# Patient Record
Sex: FEMALE | Race: WHITE | URBAN - METROPOLITAN AREA
[De-identification: names, ages, dates, MRNs, and addresses within clinical notes are randomized per-mention and may not be internally consistent; named-entity substitution may affect disease eponyms.]

---

## 2017-06-07 ENCOUNTER — OFFICE VISIT (OUTPATIENT)
Dept: ORTHOPEDIC SURGERY | Age: 52
End: 2017-06-07

## 2017-06-07 VITALS
DIASTOLIC BLOOD PRESSURE: 68 MMHG | WEIGHT: 151.24 LBS | HEART RATE: 71 BPM | HEIGHT: 65 IN | SYSTOLIC BLOOD PRESSURE: 109 MMHG | BODY MASS INDEX: 25.2 KG/M2

## 2017-06-07 DIAGNOSIS — M17.10 PATELLOFEMORAL ARTHRITIS: ICD-10-CM

## 2017-06-07 DIAGNOSIS — M25.561 RIGHT KNEE PAIN, UNSPECIFIED CHRONICITY: Primary | ICD-10-CM

## 2017-06-07 PROCEDURE — 73564 X-RAY EXAM KNEE 4 OR MORE: CPT | Performed by: ORTHOPAEDIC SURGERY

## 2017-06-07 PROCEDURE — 99204 OFFICE O/P NEW MOD 45 MIN: CPT | Performed by: ORTHOPAEDIC SURGERY

## 2017-06-13 ENCOUNTER — OFFICE VISIT (OUTPATIENT)
Dept: ORTHOPEDIC SURGERY | Age: 52
End: 2017-06-13

## 2017-06-13 VITALS
SYSTOLIC BLOOD PRESSURE: 106 MMHG | HEART RATE: 65 BPM | HEIGHT: 65 IN | DIASTOLIC BLOOD PRESSURE: 74 MMHG | BODY MASS INDEX: 25.2 KG/M2 | WEIGHT: 151.24 LBS

## 2017-06-13 DIAGNOSIS — M17.11 PATELLOFEMORAL ARTHRITIS OF RIGHT KNEE: Primary | ICD-10-CM

## 2017-06-13 PROCEDURE — 20610 DRAIN/INJ JOINT/BURSA W/O US: CPT | Performed by: ORTHOPAEDIC SURGERY

## 2017-06-13 PROCEDURE — 99214 OFFICE O/P EST MOD 30 MIN: CPT | Performed by: ORTHOPAEDIC SURGERY

## 2017-06-22 ENCOUNTER — HOSPITAL ENCOUNTER (OUTPATIENT)
Dept: PHYSICAL THERAPY | Age: 52
Discharge: HOME | End: 2017-06-30
Attending: ORTHOPAEDIC SURGERY | Admitting: ORTHOPAEDIC SURGERY

## 2017-07-01 ENCOUNTER — HOSPITAL ENCOUNTER (OUTPATIENT)
Dept: OTHER | Age: 52
Discharge: HOME | End: 2017-07-31
Attending: ORTHOPAEDIC SURGERY | Admitting: ORTHOPAEDIC SURGERY

## 2017-07-26 ENCOUNTER — OFFICE VISIT (OUTPATIENT)
Dept: ORTHOPEDIC SURGERY | Age: 52
End: 2017-07-26

## 2017-07-26 VITALS
SYSTOLIC BLOOD PRESSURE: 113 MMHG | WEIGHT: 151 LBS | HEART RATE: 70 BPM | DIASTOLIC BLOOD PRESSURE: 64 MMHG | HEIGHT: 64 IN | BODY MASS INDEX: 25.78 KG/M2

## 2017-07-26 DIAGNOSIS — M17.11 PATELLOFEMORAL ARTHRITIS OF RIGHT KNEE: Primary | ICD-10-CM

## 2017-07-26 DIAGNOSIS — M17.11 PRIMARY OSTEOARTHRITIS OF RIGHT KNEE: ICD-10-CM

## 2017-07-26 PROCEDURE — 99213 OFFICE O/P EST LOW 20 MIN: CPT | Performed by: ORTHOPAEDIC SURGERY

## 2017-08-03 ENCOUNTER — TELEPHONE (OUTPATIENT)
Dept: ORTHOPEDIC SURGERY | Age: 52
End: 2017-08-03

## 2017-08-04 ENCOUNTER — OFFICE VISIT (OUTPATIENT)
Dept: ORTHOPEDIC SURGERY | Age: 52
End: 2017-08-04

## 2017-08-04 VITALS
HEIGHT: 64 IN | BODY MASS INDEX: 25.78 KG/M2 | DIASTOLIC BLOOD PRESSURE: 68 MMHG | SYSTOLIC BLOOD PRESSURE: 106 MMHG | WEIGHT: 151.01 LBS | HEART RATE: 67 BPM

## 2017-08-04 DIAGNOSIS — M75.42 IMPINGEMENT SYNDROME, SHOULDER, LEFT: ICD-10-CM

## 2017-08-04 DIAGNOSIS — S29.011A STRAIN OF LEFT PECTORALIS MUSCLE, INITIAL ENCOUNTER: ICD-10-CM

## 2017-08-04 DIAGNOSIS — M17.11 PATELLOFEMORAL ARTHRITIS OF RIGHT KNEE: Primary | ICD-10-CM

## 2017-08-04 PROCEDURE — 20610 DRAIN/INJ JOINT/BURSA W/O US: CPT | Performed by: ORTHOPAEDIC SURGERY

## 2017-08-07 ENCOUNTER — TELEPHONE (OUTPATIENT)
Dept: ORTHOPEDIC SURGERY | Age: 52
End: 2017-08-07

## 2017-12-05 ENCOUNTER — OFFICE VISIT (OUTPATIENT)
Dept: ORTHOPEDIC SURGERY | Age: 52
End: 2017-12-05

## 2017-12-05 VITALS
BODY MASS INDEX: 22.82 KG/M2 | WEIGHT: 137 LBS | HEART RATE: 59 BPM | HEIGHT: 65 IN | SYSTOLIC BLOOD PRESSURE: 95 MMHG | DIASTOLIC BLOOD PRESSURE: 65 MMHG

## 2017-12-05 DIAGNOSIS — M17.11 PATELLOFEMORAL ARTHRITIS OF RIGHT KNEE: Primary | ICD-10-CM

## 2017-12-05 PROCEDURE — 20610 DRAIN/INJ JOINT/BURSA W/O US: CPT | Performed by: ORTHOPAEDIC SURGERY

## 2017-12-05 PROCEDURE — 99214 OFFICE O/P EST MOD 30 MIN: CPT | Performed by: ORTHOPAEDIC SURGERY

## 2017-12-05 NOTE — PROGRESS NOTES
Patient seen for follow-up evaluation of her right knee. She had a Synvisc 1 injection done on August 4. She says that her pain returned about 4 weeks later. She started to feel another areas around knee and describes it being tight in the back when she flexes her knee. No new injuries.     Pain Assessment  Location of Pain: Knee  Location Modifiers: Right  Severity of Pain: 4  Quality of Pain: Dull, Aching  Duration of Pain: Persistent  Frequency of Pain: Intermittent  Aggravating Factors: Walking, Kneeling, Squatting, Stairs  Limiting Behavior: Yes  Relieving Factors: Rest  Result of Injury: No  Work-Related Injury: No  Are there other pain locations you wish to document?: No    Past Medical History:   Diagnosis Date    Anxiety     Basal cell carcinoma 2002    Cystitis, chronic     Heart murmur     PONV (postoperative nausea and vomiting)         Past Surgical History:   Procedure Laterality Date    COLONOSCOPY  07/08/2016    JOINT REPLACEMENT Left July 2013    knee    KNEE ARTHROSCOPY      left    KNEE SURGERY      left knee    LAPAROSCOPY      SPINE SURGERY      fusion       Family History   Problem Relation Age of Onset    Osteoarthritis Mother     Asthma Mother     Hypertension Mother     Hypertension Father        Social History     Social History    Marital status:      Spouse name: N/A    Number of children: N/A    Years of education: N/A     Social History Main Topics    Smoking status: Never Smoker    Smokeless tobacco: Never Used    Alcohol use No      Comment: social    Drug use: No    Sexual activity: Yes     Partners: Male     Other Topics Concern    None     Social History Narrative    None       Current Outpatient Prescriptions   Medication Sig Dispense Refill    zolpidem (AMBIEN) 5 MG tablet Take 5 mg by mouth nightly as needed for Sleep      escitalopram (LEXAPRO) 10 MG tablet TAKE ONE TABLET BY MOUTH EVERY DAY (Patient taking differently: TAKE ONE TABLET BY

## 2018-02-23 ENCOUNTER — OFFICE VISIT (OUTPATIENT)
Dept: ORTHOPEDIC SURGERY | Age: 53
End: 2018-02-23

## 2018-02-23 VITALS
WEIGHT: 140 LBS | HEIGHT: 65 IN | BODY MASS INDEX: 23.32 KG/M2 | DIASTOLIC BLOOD PRESSURE: 69 MMHG | SYSTOLIC BLOOD PRESSURE: 96 MMHG | HEART RATE: 83 BPM

## 2018-02-23 DIAGNOSIS — M75.32 CALCIFIC TENDONITIS OF LEFT SHOULDER: Primary | ICD-10-CM

## 2018-02-23 DIAGNOSIS — M25.512 LEFT SHOULDER PAIN, UNSPECIFIED CHRONICITY: ICD-10-CM

## 2018-02-23 PROCEDURE — 99215 OFFICE O/P EST HI 40 MIN: CPT | Performed by: ORTHOPAEDIC SURGERY

## 2018-02-23 RX ORDER — DULOXETIN HYDROCHLORIDE 30 MG/1
CAPSULE, DELAYED RELEASE ORAL
COMMUNITY
Start: 2017-12-08 | End: 2018-06-12

## 2018-02-23 ASSESSMENT — ENCOUNTER SYMPTOMS: ROS SKIN COMMENTS: BASAL CELL CANCER

## 2018-02-23 NOTE — PROGRESS NOTES
Cortisone injection: left shoulder     2cc depo medrol 40mg    TAS:[id number]  Exp:04/2020  Ndc:5453-2048-60    4cc lidocaine 1%hcl    Lot:-[id number]  Exp:jan 1 2019  Ndc: 5346-2893-28
Review of Systems   Constitutional:        Weight change   Musculoskeletal: Positive for joint pain. Right knee pain   Skin:        Basal cell cancer   All other systems reviewed and are negative.
Narrative    None       Current Outpatient Prescriptions   Medication Sig Dispense Refill    DULoxetine (CYMBALTA) 30 MG extended release capsule 1 po daily for 14 days then increase to 2 po qd      zolpidem (AMBIEN) 5 MG tablet Take 5 mg by mouth nightly as needed for Sleep      cetirizine (ZYRTEC ALLERGY) 10 MG tablet Take 10 mg by mouth daily Indications: takes in evening       mometasone (NASONEX) 50 MCG/ACT nasal spray 2 sprays by Nasal route daily. (Patient taking differently: 2 sprays by Nasal route daily Indications: takes in evening ) 17 g 1    escitalopram (LEXAPRO) 10 MG tablet TAKE ONE TABLET BY MOUTH EVERY DAY (Patient taking differently: TAKE ONE TABLET BY MOUTH EVERY DAY takes in evening) 30 tablet 2     No current facility-administered medications for this visit. Allergies   Allergen Reactions    Other Other (See Comments)     Bandaids cause skin irritation       Vital signs:  BP 96/69   Pulse 83   Ht 5' 5\" (1.651 m)   Wt 140 lb (63.5 kg)   BMI 23.30 kg/m²        Neuro: Alert & oriented x 3,  normal,  no focal deficits noted. Normal affect. Eyes: sclera clear  Ears: Normal external ear  Mouth:  No perioral lesions  Pulm: Respirations unlabored and regular  Pulse: Regular rate and rhythm   Skin: Warm, well perfused        Left Shoulder Examination:    Inspection: No abnormal swelling. No erythema. No induration. Palpation: Tenderness of the greater tuberosity. Acromioclavicular joint: Nontender to palpation. Range of Motion: Full range of motion. Strength: Mild supraspinatus muscle weakness secondary to pain. Instability: No anterior or posterior subluxation. Special Tests:  Positive impingement findings. Neurovascular: Skin warm well perfused. Neurovascularly intact. Right Shoulder Examination:    Inspection: No abnormal swelling. No erythema. No induration. Palpation: No tenderness to palpation. No palpable masses.   No

## 2018-06-08 ENCOUNTER — TELEPHONE (OUTPATIENT)
Dept: ORTHOPEDIC SURGERY | Age: 53
End: 2018-06-08

## 2018-06-08 ENCOUNTER — OFFICE VISIT (OUTPATIENT)
Dept: ORTHOPEDIC SURGERY | Age: 53
End: 2018-06-08

## 2018-06-08 VITALS
SYSTOLIC BLOOD PRESSURE: 95 MMHG | HEIGHT: 64 IN | DIASTOLIC BLOOD PRESSURE: 67 MMHG | BODY MASS INDEX: 23.56 KG/M2 | HEART RATE: 73 BPM | WEIGHT: 138 LBS

## 2018-06-08 DIAGNOSIS — M75.102 TEAR OF LEFT ROTATOR CUFF, UNSPECIFIED TEAR EXTENT: Primary | ICD-10-CM

## 2018-06-08 DIAGNOSIS — M17.11 PATELLOFEMORAL ARTHRITIS OF RIGHT KNEE: ICD-10-CM

## 2018-06-08 PROCEDURE — 99214 OFFICE O/P EST MOD 30 MIN: CPT | Performed by: ORTHOPAEDIC SURGERY

## 2018-06-12 ENCOUNTER — TELEPHONE (OUTPATIENT)
Dept: ORTHOPEDIC SURGERY | Age: 53
End: 2018-06-12

## 2018-06-12 RX ORDER — FOLIC ACID 1 MG/1
1 TABLET ORAL DAILY
COMMUNITY

## 2018-06-12 RX ORDER — ESZOPICLONE 3 MG/1
3 TABLET, FILM COATED ORAL NIGHTLY
COMMUNITY

## 2018-06-14 ENCOUNTER — HOSPITAL ENCOUNTER (OUTPATIENT)
Dept: PREADMISSION TESTING | Age: 53
Discharge: HOME | End: 2018-06-14
Attending: ORTHOPAEDIC SURGERY | Admitting: ORTHOPAEDIC SURGERY

## 2018-06-14 VITALS
HEART RATE: 70 BPM | HEIGHT: 64 IN | BODY MASS INDEX: 23.56 KG/M2 | DIASTOLIC BLOOD PRESSURE: 70 MMHG | WEIGHT: 138 LBS | OXYGEN SATURATION: 98 % | TEMPERATURE: 98.4 F | SYSTOLIC BLOOD PRESSURE: 120 MMHG | RESPIRATION RATE: 17 BRPM

## 2018-06-14 DIAGNOSIS — G89.18 POST-OP PAIN: Primary | ICD-10-CM

## 2018-06-14 LAB
GLUCOSE BLD-MCNC: 80 MG/DL (ref 70–99)
PERFORMED ON: NORMAL

## 2018-06-14 RX ORDER — ROPIVACAINE HYDROCHLORIDE 5 MG/ML
60 INJECTION, SOLUTION EPIDURAL; INFILTRATION; PERINEURAL ONCE
Status: DISCONTINUED | OUTPATIENT
Start: 2018-06-14 | End: 2018-06-15 | Stop reason: HOSPADM

## 2018-06-14 RX ORDER — MIDAZOLAM HYDROCHLORIDE 1 MG/ML
2 INJECTION INTRAMUSCULAR; INTRAVENOUS ONCE
Status: COMPLETED | OUTPATIENT
Start: 2018-06-14 | End: 2018-06-14

## 2018-06-14 RX ORDER — FENTANYL CITRATE 50 UG/ML
25 INJECTION, SOLUTION INTRAMUSCULAR; INTRAVENOUS EVERY 5 MIN PRN
Status: DISCONTINUED | OUTPATIENT
Start: 2018-06-14 | End: 2018-06-15 | Stop reason: HOSPADM

## 2018-06-14 RX ORDER — FENTANYL CITRATE 50 UG/ML
25 INJECTION, SOLUTION INTRAMUSCULAR; INTRAVENOUS ONCE
Status: COMPLETED | OUTPATIENT
Start: 2018-06-14 | End: 2018-06-14

## 2018-06-14 RX ORDER — ONDANSETRON 2 MG/ML
4 INJECTION INTRAMUSCULAR; INTRAVENOUS ONCE
Status: DISCONTINUED | OUTPATIENT
Start: 2018-06-14 | End: 2018-06-15 | Stop reason: HOSPADM

## 2018-06-14 RX ORDER — FENTANYL CITRATE 50 UG/ML
50 INJECTION, SOLUTION INTRAMUSCULAR; INTRAVENOUS EVERY 5 MIN PRN
Status: DISCONTINUED | OUTPATIENT
Start: 2018-06-14 | End: 2018-06-15 | Stop reason: HOSPADM

## 2018-06-14 RX ORDER — DEXAMETHASONE SODIUM PHOSPHATE 4 MG/ML
4 INJECTION, SOLUTION INTRA-ARTICULAR; INTRALESIONAL; INTRAMUSCULAR; INTRAVENOUS; SOFT TISSUE
Status: ACTIVE | OUTPATIENT
Start: 2018-06-14 | End: 2018-06-14

## 2018-06-14 RX ORDER — HYDRALAZINE HYDROCHLORIDE 20 MG/ML
5 INJECTION INTRAMUSCULAR; INTRAVENOUS EVERY 10 MIN PRN
Status: DISCONTINUED | OUTPATIENT
Start: 2018-06-14 | End: 2018-06-15 | Stop reason: HOSPADM

## 2018-06-14 RX ORDER — PROMETHAZINE HYDROCHLORIDE 25 MG/ML
6.25 INJECTION, SOLUTION INTRAMUSCULAR; INTRAVENOUS
Status: ACTIVE | OUTPATIENT
Start: 2018-06-14 | End: 2018-06-14

## 2018-06-14 RX ORDER — MEPERIDINE HYDROCHLORIDE 25 MG/ML
12.5 INJECTION INTRAMUSCULAR; INTRAVENOUS; SUBCUTANEOUS EVERY 5 MIN PRN
Status: DISCONTINUED | OUTPATIENT
Start: 2018-06-14 | End: 2018-06-15 | Stop reason: HOSPADM

## 2018-06-14 RX ORDER — SCOLOPAMINE TRANSDERMAL SYSTEM 1 MG/1
1 PATCH, EXTENDED RELEASE TRANSDERMAL
Status: DISCONTINUED | OUTPATIENT
Start: 2018-06-14 | End: 2018-06-15 | Stop reason: HOSPADM

## 2018-06-14 RX ORDER — SODIUM CHLORIDE, SODIUM LACTATE, POTASSIUM CHLORIDE, CALCIUM CHLORIDE 600; 310; 30; 20 MG/100ML; MG/100ML; MG/100ML; MG/100ML
INJECTION, SOLUTION INTRAVENOUS CONTINUOUS
Status: DISCONTINUED | OUTPATIENT
Start: 2018-06-14 | End: 2018-06-15 | Stop reason: HOSPADM

## 2018-06-14 RX ORDER — ONDANSETRON 2 MG/ML
4 INJECTION INTRAMUSCULAR; INTRAVENOUS
Status: COMPLETED | OUTPATIENT
Start: 2018-06-14 | End: 2018-06-14

## 2018-06-14 RX ORDER — LABETALOL HYDROCHLORIDE 5 MG/ML
5 INJECTION, SOLUTION INTRAVENOUS EVERY 10 MIN PRN
Status: DISCONTINUED | OUTPATIENT
Start: 2018-06-14 | End: 2018-06-15 | Stop reason: HOSPADM

## 2018-06-14 RX ORDER — ONDANSETRON 2 MG/ML
4 INJECTION INTRAMUSCULAR; INTRAVENOUS
Status: ACTIVE | OUTPATIENT
Start: 2018-06-14 | End: 2018-06-14

## 2018-06-14 RX ORDER — MIDAZOLAM HYDROCHLORIDE 1 MG/ML
1 INJECTION INTRAMUSCULAR; INTRAVENOUS ONCE
Status: DISCONTINUED | OUTPATIENT
Start: 2018-06-14 | End: 2018-06-15 | Stop reason: HOSPADM

## 2018-06-14 RX ADMIN — SODIUM CHLORIDE, SODIUM LACTATE, POTASSIUM CHLORIDE, CALCIUM CHLORIDE: 600; 310; 30; 20 INJECTION, SOLUTION INTRAVENOUS at 10:25

## 2018-06-14 RX ADMIN — ONDANSETRON 4 MG: 2 INJECTION INTRAMUSCULAR; INTRAVENOUS at 11:50

## 2018-06-14 RX ADMIN — MIDAZOLAM HYDROCHLORIDE 2 MG: 1 INJECTION INTRAMUSCULAR; INTRAVENOUS at 11:15

## 2018-06-14 RX ADMIN — FENTANYL CITRATE 50 MCG: 50 INJECTION, SOLUTION INTRAMUSCULAR; INTRAVENOUS at 11:29

## 2018-06-14 ASSESSMENT — PAIN SCALES - GENERAL
PAINLEVEL_OUTOF10: 0
PAINLEVEL_OUTOF10: 0

## 2018-06-14 ASSESSMENT — PAIN - FUNCTIONAL ASSESSMENT: PAIN_FUNCTIONAL_ASSESSMENT: 0-10

## 2018-06-18 ENCOUNTER — TELEPHONE (OUTPATIENT)
Dept: ORTHOPEDIC SURGERY | Age: 53
End: 2018-06-18

## 2018-06-20 ENCOUNTER — OFFICE VISIT (OUTPATIENT)
Dept: ORTHOPEDIC SURGERY | Age: 53
End: 2018-06-20

## 2018-06-20 ENCOUNTER — HOSPITAL ENCOUNTER (OUTPATIENT)
Dept: PHYSICAL THERAPY | Age: 53
Discharge: HOME | End: 2018-06-30
Attending: ORTHOPAEDIC SURGERY | Admitting: ORTHOPAEDIC SURGERY

## 2018-06-20 VITALS
HEIGHT: 64 IN | SYSTOLIC BLOOD PRESSURE: 110 MMHG | WEIGHT: 138 LBS | BODY MASS INDEX: 23.56 KG/M2 | HEART RATE: 65 BPM | DIASTOLIC BLOOD PRESSURE: 70 MMHG

## 2018-06-20 DIAGNOSIS — M75.102 TEAR OF LEFT ROTATOR CUFF, UNSPECIFIED TEAR EXTENT: Primary | ICD-10-CM

## 2018-06-20 DIAGNOSIS — M75.32 CALCIFIC TENDONITIS OF LEFT SHOULDER: ICD-10-CM

## 2018-06-20 DIAGNOSIS — Z98.890 S/P ROTATOR CUFF REPAIR: ICD-10-CM

## 2018-06-20 PROCEDURE — 99024 POSTOP FOLLOW-UP VISIT: CPT | Performed by: ORTHOPAEDIC SURGERY

## 2018-06-20 RX ORDER — OXYCODONE HYDROCHLORIDE AND ACETAMINOPHEN 5; 325 MG/1; MG/1
1 TABLET ORAL EVERY 6 HOURS PRN
Qty: 28 TABLET | Refills: 0 | Status: SHIPPED | OUTPATIENT
Start: 2018-06-20 | End: 2018-06-27

## 2018-06-20 NOTE — FLOWSHEET NOTE
order to prevent re-injury. 2. Patient will have a decrease in pain to facilitate improvement in movement, function, and ADLs as indicated by Functional Deficits. Long Term Goals: To be achieved in:16 weeks  1. Disability index score of 25% or less for the UEFS to assist with reaching prior level of function. 2. Patient will demonstrate increased [facility] to [facility] to allow for proper joint functioning as indicated by patients Functional Deficits. 3. Patient will demonstrate an increase in Strength to 4+/5 scapular and core control  for UE to allow for proper functional mobility as indicated by patients Functional Deficits. 4. Patient will return to  functional activities such as donning/doffing clothing  without increased symptoms or restriction. Progression Towards Functional goals:  [] Patient is progressing as expected towards functional goals listed. [] Progression is slowed due to complexities listed. [] Progression has been slowed due to co-morbidities. [x] Plan just implemented, too soon to assess goals progression  [] Other:     ASSESSMENT:  See eval    Treatment/Activity Tolerance:  [x] Patient tolerated treatment well [] Patient limited by fatique  [] Patient limited by pain  [] Patient limited by other medical complications  [] Other:     Patient education:  6/20 - Reviewed prognosis/POC, ROM restrictions, s/s of infection, sling wearing schedule. Sling adjusted for proper fit.      Prognosis: [] Good [x] Fair  [] Poor    Patient Requires Follow-up: [x] Yes  [] No    PLAN: See eval  [] Continue per plan of care [] Alter current plan (see comments)  [x] Plan of care initiated [] Hold pending MD visit [] Discharge    Electronically signed by: Skip Ham PT, DPT

## 2018-06-20 NOTE — PLAN OF CARE
The [address]  Phone [phone]  Fax [phone]      Physical Therapy Certification    Dear Referring Practitioner: Odalys Kan,    We had the pleasure of evaluating the following patient for physical therapy services at [address] [place] [address]. A summary of our findings can be found in the initial assessment below. This includes our plan of care. If you have any questions or concerns regarding these findings, please do not hesitate to contact me at the office phone number checked above. Thank you for the referral.       Physician Signature:_______________________________Date:__________________  By signing above (or electronic signature), therapists plan is approved by physician      Patient: Marquis Lipscomb   : 1965   MRN: [id number]  Referring Physician: Referring Practitioner: Odalys Kan      Evaluation Date: 2018      Medical Diagnosis Information:  Diagnosis: M75.102 (ICD-10-CM) - Tear of left rotator cuff, unspecified tear extent M75.32 (ICD-10-CM) - Calcific tendonitis of left shoulder Z98.890 (ICD-10-CM) - S/P rotator cuff repair   Treatment Diagnosis: L shoulder pain, impaired ROM, impaired strength secondary to RTC repair surgery                                          Insurance information: PT Insurance Information: Sara (20 visits)    Precautions/ Contra-indications: Dr. Delta Card RTC Repair protocol   Latex Allergy:  [x]NO      []YES  Preferred Language for Healthcare:   [x]English       []other:    SUBJECTIVE: Patient stated complaint: pt is a 47 yof 1 wk s/p Left shoulder arthroscopy with labral debridement; chondroplasty; debridement of calcium deposits, rotator cuff; rotator cuff repair; and subacromial decompression (DOS 18). Injured herself through wear and tear and fell down a flight of concrete steps. PLOF indep w/ all ADLS and full mobility .  Would higher risk   TUG score (>12s at risk):     [] Falls education provided, including       G-Codes:  PT G-Codes  Functional Assessment Tool Used: UEFI  Score: 100% impaired  Functional Limitation: Carrying, moving and handling objects  Carrying, Moving and Handling Objects Current Status (): 100 percent impaired, limited or restricted  Carrying, Moving and Handling Objects Goal Status (): At least 20 percent but less than 40 percent impaired, limited or restricted    ASSESSMENT:   Functional Impairments   [x]Noted spinal or UE joint hypomobility   []Noted spinal or UE joint hypermobility   [x]Decreased UE functional ROM   [x]Decreased UE functional strength   []Abnormal reflexes/sensation/myotomal/dermatomal deficits   [x]Decreased RC/scapular/core strength and neuromuscular control   []other:      Functional Activity Limitations (from functional questionnaire and intake)   [x]Reduced ability to tolerate prolonged functional positions   [x]Reduced ability or difficulty with changes of positions or transfers between positions   [x]Reduced ability to maintain good posture and demonstrate good body mechanics with sitting, bending, and lifting   [x] Reduced ability or tolerance with driving and/or computer work   [x]Reduced ability to sleep   [x]Reduced ability to perform lifting, reaching, carrying tasks   [x]Reduced ability to tolerate impact through UE   [x]Reduced ability to reach behind back   [x]Reduced ability to  or hold objects   [x]Reduced ability to throw or toss an object   []other:      Participation Restrictions   [x]Reduced participation in self care activities   [x]Reduced participation in home management activities   [x]Reduced participation in work activities   [x]Reduced participation in social activities. [x]Reduced participation in sport / recreational activities.     Classification:   [x]Signs/symptoms consistent with post-surgical status including decreased ROM, strength and

## 2018-06-26 NOTE — FLOWSHEET NOTE
IR        Other        Other             Strength L R Comment   Flexion      Abduction      ER      IR      Supraspinatus      Upper Trap      Lower Trap      Mid Trap      Rhomboids      Biceps      Triceps      Horizontal Abduction      Horizontal Adduction      Lats          RESTRICTIONS/PRECAUTIONS:  Dr. Suzy Hoff RTC Repair Protocol       Exercises/Interventions:   Exercise/Equipment Resistance/Repetitions Other comments   Aerobic Conditioning     Aerodyne          Stretching/PROM     Wand     Table Slides 10x:10    Wall slides      UE [place]     Pulleys     Pendulum     BB IR     SL IR     Pec doorway stretch     CBA stretch     UT stretch     LS stretch     Elbow flex/ext 10x:10                   Isometrics     Retraction 30x         Weight shift     Flexion     Abduction     External Rotation     Internal Rotation     Biceps     Triceps          PRE's     Flexion     Abduction     External Rotation     Internal Rotation     Shrugs 30x    EXT     Reverse Flys     Serratus     Horizontal Abd with ER     Biceps     Triceps     Retraction 30x         Cable Column/Theraband     External Rotation     Internal Rotation     Shrugs     Lats     Ext     Flex     Scapular Retraction     BIC     TRIC     PNF          Dynamic Stability          Plyoback              Therapeutic Exercise and NMR EXR  [x] (93783) Provided verbal/tactile cueing for activities related to strengthening, flexibility, endurance, ROM  for improvements in scapular, scapulothoracic and UE control with self care, reaching, carrying, lifting, house/yardwork, driving/computer work.    [] (53613) Provided verbal/tactile cueing for activities related to improving balance, coordination, kinesthetic sense, posture, motor skill, proprioception  to assist with  scapular, scapulothoracic and UE control with self care, reaching, carrying, lifting, house/yardwork, driving/computer work.     Therapeutic Activities:    [] (54737 or 79829) Provided Independent in HEP and progression per patient tolerance, in order to prevent re-injury. 2. Patient will have a decrease in pain to facilitate improvement in movement, function, and ADLs as indicated by Functional Deficits. Long Term Goals: To be achieved in:16 weeks  1. Disability index score of 25% or less for the UEFS to assist with reaching prior level of function. 2. Patient will demonstrate [facility] to [facility] to allow for proper joint functioning as indicated by patients Functional Deficits. 3. Patient will demonstrate an increase in Strength to 4+/5 scapular and core control  for UE to allow for proper functional mobility as indicated by patients Functional Deficits. 4. Patient will return to  functional activities such as donning/doffing clothing  without increased symptoms or restriction. Progression Towards Functional goals:  [] Patient is progressing as expected towards functional goals listed. [] Progression is slowed due to complexities listed. [] Progression has been slowed due to co-morbidities. [x] Plan just implemented, too soon to assess goals progression  [] Other:     ASSESSMENT:      Treatment/Activity Tolerance:  [x] Patient tolerated treatment well [] Patient limited by fatique  [] Patient limited by pain  [] Patient limited by other medical complications  [] Other: Pt colby tx well. She demo good PROM table slides without c/o pain/problems. She demo good comprehension of HEP. We did adjust her sling for comfort. We discussed frequency. Pt will be leaving in September to return to the [facility]. 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 She would like to be able to return to [facility] and [facility]. Per Tyler Cardenas PT, pt can come out of the sling at 4-6 weeks. She can drive when she is out of the sling and off narcotic pain medications.   She will see Dr. Cierra Billings at 6 wks post op unless he is out of the office.- 6/26/18    Patient education:  6/20 - Reviewed prognosis/POC, ROM restrictions, s/s of infection, sling wearing schedule. Sling adjusted for proper fit. Prognosis: [] Good [x] Fair  [] Poor    Patient Requires Follow-up: [x] Yes  [] No    PLAN: Consider gentle cane ER, table slide scaption, PROM manual pending pt's available motion. Continue tx once a week to every other week.      [x] Continue per plan of care [] Alter current plan (see comments)  [] Plan of care initiated [] Hold pending MD visit [] Discharge    Electronically signed by: Kelly Day DPT [id number]

## 2018-07-01 ENCOUNTER — HOSPITAL ENCOUNTER (OUTPATIENT)
Dept: OTHER | Age: 53
Discharge: HOME | End: 2018-07-31
Attending: ORTHOPAEDIC SURGERY | Admitting: ORTHOPAEDIC SURGERY

## 2018-07-01 ENCOUNTER — HOSPITAL ENCOUNTER (OUTPATIENT)
Dept: PHYSICAL THERAPY | Age: 53
Discharge: HOME | End: 2018-07-01
Attending: ORTHOPAEDIC SURGERY | Admitting: ORTHOPAEDIC SURGERY

## 2018-07-05 ENCOUNTER — HOSPITAL ENCOUNTER (OUTPATIENT)
Dept: PHYSICAL THERAPY | Age: 53
Discharge: HOME | End: 2018-07-06
Admitting: ORTHOPAEDIC SURGERY

## 2018-07-05 NOTE — FLOWSHEET NOTE
Abduction      ER      IR      Supraspinatus      Upper Trap      Lower Trap      Mid Trap      Rhomboids      Biceps      Triceps      Horizontal Abduction      Horizontal Adduction      Lats          RESTRICTIONS/PRECAUTIONS:  Dr. Tobin Hernandez RTC Repair Protocol       Exercises/Interventions:   Exercise/Equipment Resistance/Repetitions Other comments   Aerobic Conditioning     Aerodyne          Stretching/PROM     Wand 10x:10 Seated ER   Table Slides 10x:10 Flex and scap   Wall slides      UE [place]     Pulleys     Pendulum     BB IR     SL IR     Pec doorway stretch     CBA stretch     UT stretch     LS stretch     Elbow flex/ext                    Isometrics     Retraction 30x     30x green    Weight shift     Flexion     Abduction     External Rotation     Internal Rotation     Biceps     Triceps          PRE's     Flexion     Abduction     External Rotation     Internal Rotation     Shrugs 30x    EXT     Reverse Flys     Serratus     Horizontal Abd with ER     Biceps     Triceps     Retraction 30x    Wrist ext ECL 3x10 1# ECL              Cable Column/Theraband     External Rotation     Internal Rotation     Shrugs     Lats     Ext     Flex     Scapular Retraction     BIC     TRIC     PNF          Dynamic Stability          Plyoback          Manual 8' gentle flex, scap, IR, ER at 30 deg ABD             Therapeutic Exercise and NMR EXR  [x] (97455) Provided verbal/tactile cueing for activities related to strengthening, flexibility, endurance, ROM  for improvements in scapular, scapulothoracic and UE control with self care, reaching, carrying, lifting, house/yardwork, driving/computer work.    [] (13816) Provided verbal/tactile cueing for activities related to improving balance, coordination, kinesthetic sense, posture, motor skill, proprioception  to assist with  scapular, scapulothoracic and UE control with self care, reaching, carrying, lifting, house/yardwork, driving/computer work.     Therapeutic Activities:    [] (16813 or 55161) Provided verbal/tactile cueing for activities related to improving balance, coordination, kinesthetic sense, posture, motor skill, proprioception and motor activation to allow for proper function of scapular, scapulothoracic and UE control with self care, carrying, lifting, driving/computer work.      Home Exercise Program:    [x] (42699) Reviewed/Progressed HEP activities related to strengthening, flexibility, endurance, ROM of scapular, scapulothoracic and UE control with self care, reaching, carrying, lifting, house/yardwork, driving/computer work  [] (90552) Reviewed/Progressed HEP activities related to improving balance, coordination, kinesthetic sense, posture, motor skill, proprioception of scapular, scapulothoracic and UE control with self care, reaching, carrying, lifting, house/yardwork, driving/computer work      Manual Treatments:  PROM / STM / Oscillations-Mobs:  G-I, II, III, IV (PA's, Inf., Post.)  [x] (98644) Provided manual therapy to mobilize soft tissue/joints of cervical/CT, scapular GHJ and UE for the purpose of modulating pain, promoting relaxation,  increasing ROM, reducing/eliminating soft tissue swelling/inflammation/restriction, improving soft tissue extensibility and allowing for proper ROM for normal function with self care, reaching, carrying, lifting, house/yardwork, driving/computer work    Modalities: declined    Charges:   Timed Code Treatment Minutes: 23'   Total Treatment Minutes: 45'       [] EVAL (LOW) 95329 (typically 20 minutes face-to-face)  [] EVAL (MOD) 04409 (typically 30 minutes face-to-face)  [] EVAL (HIGH) 14700 (typically 45 minutes face-to-face)  [] RE-EVAL     [] NM(94235) x      [] IONTO  [] NMR (01993) x      [] VASO  [x] Manual (55233) x  1    [] Other:  [x] TA x  1    [] Mech Traction (23893)  [] ES(attended) (85592)      [] ES (un) (51569):     GOALS:  Patient stated goal: full use of arm    Therapist goals for Patient:   Short Term Goals: To be achieved in: 2 weeks  1. Independent in HEP and progression per patient tolerance, in order to prevent re-injury. 2. Patient will have a decrease in pain to facilitate improvement in movement, function, and ADLs as indicated by Functional Deficits. Long Term Goals: To be achieved in:16 weeks  1. Disability index score of 25% or less for the UEFS to assist with reaching prior level of function. 2. Patient will demonstrate increased [facility] to [facility] to allow for proper joint functioning as indicated by patients Functional Deficits. 3. Patient will demonstrate an increase in Strength to 4+/5 scapular and core control  for UE to allow for proper functional mobility as indicated by patients Functional Deficits. 4. Patient will return to  functional activities such as donning/doffing clothing  without increased symptoms or restriction. Progression Towards Functional goals:  [] Patient is progressing as expected towards functional goals listed. [] Progression is slowed due to complexities listed. [] Progression has been slowed due to co-morbidities. [x] Plan just implemented, too soon to assess goals progression  [] Other:     ASSESSMENT:      Treatment/Activity Tolerance:  [x] Patient tolerated treatment well [] Patient limited by fatique  [] Patient limited by pain  [] Patient limited by other medical complications  [] Other: Pt colby tx well. We did review wearing the sling and returning to driving. We also discussed seeing the MD at 6 wks post op, which she will check into. She demo good comprehension of HEP. We reviewed and did wrist ECL. She reported that she could feel this with her wrist extensors/elbow. We did try wrist ext stretch, but it did bother her wrist. Her shoulder motion is progressing appropriately. Patient education:  6/20 - Reviewed prognosis/POC, ROM restrictions, s/s of infection, sling wearing schedule. Sling adjusted for proper fit.       Prognosis: [] Good [x] Fair  [] Poor    Patient Requires Follow-up: [x] Yes  [] No    PLAN: Consider progressing gentle manual ROM. Continue tx once a week to every other week. Probably wean out of ABD pillow.      [x] Continue per plan of care [] Alter current plan (see comments)  [] Plan of care initiated [] Hold pending MD visit [] Discharge    Electronically signed by: Dion Palacios DPT [id number]

## 2018-07-10 ENCOUNTER — TELEPHONE (OUTPATIENT)
Dept: ORTHOPEDIC SURGERY | Age: 53
End: 2018-07-10

## 2018-07-11 ENCOUNTER — HOSPITAL ENCOUNTER (OUTPATIENT)
Dept: PHYSICAL THERAPY | Age: 53
Discharge: HOME | End: 2018-07-12
Admitting: ORTHOPAEDIC SURGERY

## 2018-07-11 NOTE — FLOWSHEET NOTE
proprioception  to assist with  scapular, scapulothoracic and UE control with self care, reaching, carrying, lifting, house/yardwork, driving/computer work. Therapeutic Activities:    [] (64121 or 78099) Provided verbal/tactile cueing for activities related to improving balance, coordination, kinesthetic sense, posture, motor skill, proprioception and motor activation to allow for proper function of scapular, scapulothoracic and UE control with self care, carrying, lifting, driving/computer work.      Home Exercise Program:    [x] (41608) Reviewed/Progressed HEP activities related to strengthening, flexibility, endurance, ROM of scapular, scapulothoracic and UE control with self care, reaching, carrying, lifting, house/yardwork, driving/computer work  [] (71278) Reviewed/Progressed HEP activities related to improving balance, coordination, kinesthetic sense, posture, motor skill, proprioception of scapular, scapulothoracic and UE control with self care, reaching, carrying, lifting, house/yardwork, driving/computer work      Manual Treatments:  PROM / STM / Oscillations-Mobs:  G-I, II, III, IV (PA's, Inf., Post.)  [x] (60297) Provided manual therapy to mobilize soft tissue/joints of cervical/CT, scapular GHJ and UE for the purpose of modulating pain, promoting relaxation,  increasing ROM, reducing/eliminating soft tissue swelling/inflammation/restriction, improving soft tissue extensibility and allowing for proper ROM for normal function with self care, reaching, carrying, lifting, house/yardwork, driving/computer work    Modalities: declined    Charges:   Timed Code Treatment Minutes: 30'   Total Treatment Minutes: 60'       [] EVAL (LOW) 96673 (typically 20 minutes face-to-face)  [] EVAL (MOD) 91380 (typically 30 minutes face-to-face)  [] EVAL (HIGH) 78338 (typically 45 minutes face-to-face)  [] RE-EVAL     [] HG(61332) x      [] IONTO  [] NMR (13072) x      [] VASO  [x] Manual (15492) x  1    [] Other:  [x] TA x 1    [] [facility] Traction (52018)  [] ES(attended) (50483)      [] ES (un) (17521):     GOALS:  Patient stated goal: full use of arm    Therapist goals for Patient:   Short Term Goals: To be achieved in: 2 weeks  1. Independent in HEP and progression per patient tolerance, in order to prevent re-injury. 2. Patient will have a decrease in pain to facilitate improvement in movement, function, and ADLs as indicated by Functional Deficits. Long Term Goals: To be achieved in:16 weeks  1. Disability index score of 25% or less for the UEFS to assist with reaching prior level of function. 2. Patient will demonstrate increased [facility] to allow for proper joint functioning as indicated by patients Functional Deficits. 3. Patient will demonstrate an increase in Strength to 4+/5 scapular and core control  for UE to allow for proper functional mobility as indicated by patients Functional Deficits. 4. Patient will return to  functional activities such as donning/doffing clothing  without increased symptoms or restriction. Progression Towards Functional goals:  [] Patient is progressing as expected towards functional goals listed. [] Progression is slowed due to complexities listed. [] Progression has been slowed due to co-morbidities. [x] Plan just implemented, too soon to assess goals progression  [] Other:     ASSESSMENT:      Treatment/Activity Tolerance:  [x] Patient tolerated treatment well [] Patient limited by fatique  [] Patient limited by pain  [] Patient limited by other medical complications  [] Other: Pt colby tx well. She required occ VCs for proper form. She does have generally a good comprehension of her HEP. She colby manual tx well, which was performed gently. She demo good and appropriate ROM. She was instructed that starting tomorrow (4 wks post op) that she could take her ABD pillow off. She wsa instructed how to do this.   She was instructed that she could continue to use the pillow if hs needed for her comfort. Patient education:  6/20 - Reviewed prognosis/POC, ROM restrictions, s/s of infection, sling wearing schedule. Sling adjusted for proper fit. Prognosis: [] Good [x] Fair  [] Poor    Patient Requires Follow-up: [x] Yes  [] No    PLAN: Consider progressing gentle manual ROM. Continue tx once a week to every other week. Take measurements of contralateral shoulder.    [x] Continue per plan of care [] Alter current plan (see comments)  [] Plan of care initiated [] Hold pending MD visit [] Discharge    Electronically signed by: Lizandro Rivers DPT [id number]

## 2018-07-18 ENCOUNTER — HOSPITAL ENCOUNTER (OUTPATIENT)
Dept: PHYSICAL THERAPY | Age: 53
Discharge: HOME | End: 2018-07-19
Admitting: ORTHOPAEDIC SURGERY

## 2018-07-18 NOTE — PLAN OF CARE
Naa , [facility] DR ANDREA GÓMEZ     Physical Therapy Re-Certification Plan of Care    Dear Dr. Lisa Caro,    We had the pleasure of treating the following patient for physical therapy services at [address] [place] [address]. A summary of our findings can be found in the updated assessment below. This includes our plan of care. If you have any questions or concerns regarding these findings, please do not hesitate to contact me at the office phone number checked above. Thank you for the referral.     Physician Signature:________________________________Date:__________________  By signing above (or electronic signature), therapists plan is approved by physician    Date Range Of Visits:   Total Visits to Date: 5  Overall Response to Treatment:   [x]Patient is responding well to treatment and improvement is noted with regards  to goals   []Patient should continue to improve in reasonable time if they continue HEP   []Patient has plateaued and is no longer responding to skilled PT intervention    []Patient is getting worse and would benefit from return to referring MD   []Patient unable to adhere to initial POC   []Other: Pt colby tx fair/well. She did have some more pain with ER stretching today. She was instructed to continue the use of her sling. She demo some more limited ROM today compared to her last visit PROM with manual tx. I have reviewed her HEP and her protocol with her. I've encouraged her to continue with ice as well as the sling. I will see her once more before she returns to see Dr. Lisa Caro.                          Physical Therapy Daily Treatment Note  Date:  2018    Patient Name:  Isabella Julian    :  1965  MRN: [id number]  Restrictions/Precautions:    Medical/Treatment Diagnosis Information:    M75.102 (ICD-10-CM) - Tear of left rotator cuff, unspecified tear extent; M75.32 (ICD-10-CM) - Calcific tendonitis of left

## 2018-07-25 ENCOUNTER — HOSPITAL ENCOUNTER (OUTPATIENT)
Dept: PHYSICAL THERAPY | Age: 53
Discharge: HOME | End: 2018-07-26
Admitting: ORTHOPAEDIC SURGERY

## 2018-07-25 NOTE — FLOWSHEET NOTE
related to improving balance, coordination, kinesthetic sense, posture, motor skill, proprioception  to assist with  scapular, scapulothoracic and UE control with self care, reaching, carrying, lifting, house/yardwork, driving/computer work. Therapeutic Activities:    [] (45649 or 73342) Provided verbal/tactile cueing for activities related to improving balance, coordination, kinesthetic sense, posture, motor skill, proprioception and motor activation to allow for proper function of scapular, scapulothoracic and UE control with self care, carrying, lifting, driving/computer work.      Home Exercise Program:    [x] (67737) Reviewed/Progressed HEP activities related to strengthening, flexibility, endurance, ROM of scapular, scapulothoracic and UE control with self care, reaching, carrying, lifting, house/yardwork, driving/computer work  [] (28126) Reviewed/Progressed HEP activities related to improving balance, coordination, kinesthetic sense, posture, motor skill, proprioception of scapular, scapulothoracic and UE control with self care, reaching, carrying, lifting, house/yardwork, driving/computer work      Manual Treatments:  PROM / STM / Oscillations-Mobs:  G-I, II, III, IV (PA's, Inf., Post.)  [x] (01857) Provided manual therapy to mobilize soft tissue/joints of cervical/CT, scapular GHJ and UE for the purpose of modulating pain, promoting relaxation,  increasing ROM, reducing/eliminating soft tissue swelling/inflammation/restriction, improving soft tissue extensibility and allowing for proper ROM for normal function with self care, reaching, carrying, lifting, house/yardwork, driving/computer work    Modalities: declined    Charges:   Timed Code Treatment Minutes: 25'   Total Treatment Minutes: 40'       [] EVAL (LOW) 19913 (typically 20 minutes face-to-face)  [] EVAL (MOD) 45546 (typically 30 minutes face-to-face)  [] EVAL (HIGH) 30149 (typically 45 minutes face-to-face)  [] RE-EVAL     [] QJ(13262) x      [] IONTO  [] NMR (65997) x      [] VASO  [x] Manual (48963) x  1    [] Other:  [x] TA x  1    [] Mech Traction (06565)  [] ES(attended) (27028)      [] ES (un) (06365):     GOALS:  Patient stated goal: full use of arm    Therapist goals for Patient:   Short Term Goals: To be achieved in: 2 weeks  1. Independent in HEP and progression per patient tolerance, in order to prevent re-injury. -met  2. Patient will have a decrease in pain to facilitate improvement in movement, function, and ADLs as indicated by Functional Deficits. -partially met    Long Term Goals: To be achieved in:16 weeks  1. Disability index score of 25% or less for the UEFS to assist with reaching prior level of function. -ongoing  2. Patient will demonstrate increased [facility] to [facility] to allow for proper joint functioning as indicated by patients Functional Deficits. -ongoing  3. Patient will demonstrate an increase in Strength to 4+/5 scapular and core control  for UE to allow for proper functional mobility as indicated by patients Functional Deficits.-ongoing   4. Patient will return to  functional activities such as donning/doffing clothing  without increased symptoms or restriction. - ongoing     Progression Towards Functional goals:  [] Patient is progressing as expected towards functional goals listed. [] Progression is slowed due to complexities listed. [] Progression has been slowed due to co-morbidities. [x] Plan just implemented, too soon to assess goals progression  [] Other:     ASSESSMENT:      Treatment/Activity Tolerance:  [x] Patient tolerated treatment well [] Patient limited by fatique  [] Patient limited by pain  [] Patient limited by other medical complications  [] Other: Pt colby tx well. She demo improved ROM, but she does have tightness currently in ROM. She will be seeing the MD next week. I did not progress her to isometrics yet until seeing the MD.  She did have a knot around her deltoid that I did do some gentle STM.   We may try IASTM on this as well. I will pass this along to original tx PT in [place] as well. Patient education:  6/20 - Reviewed prognosis/POC, ROM restrictions, s/s of infection, sling wearing schedule. Sling adjusted for proper fit. Prognosis: [] Good [x] Fair  [] Poor    Patient Requires Follow-up: [x] Yes  [] No    PLAN: Consider progressing gentle manual ROM. Continue tx once a week to every other week.   We may increase frequency as pt would like to leave for [place] in the middle of Sept.    [x] Continue per plan of care [] Alter current plan (see comments)  [] Plan of care initiated [] Hold pending MD visit [] Discharge    Electronically signed by: oYnatan Swartz DPT [id number]

## 2018-07-31 ENCOUNTER — OFFICE VISIT (OUTPATIENT)
Dept: ORTHOPEDIC SURGERY | Age: 53
End: 2018-07-31

## 2018-07-31 VITALS
BODY MASS INDEX: 23.56 KG/M2 | HEART RATE: 60 BPM | SYSTOLIC BLOOD PRESSURE: 99 MMHG | WEIGHT: 138 LBS | HEIGHT: 64 IN | DIASTOLIC BLOOD PRESSURE: 67 MMHG

## 2018-07-31 DIAGNOSIS — M17.11 PATELLOFEMORAL ARTHRITIS OF RIGHT KNEE: ICD-10-CM

## 2018-07-31 DIAGNOSIS — Z98.890 S/P ROTATOR CUFF REPAIR: Primary | ICD-10-CM

## 2018-07-31 PROCEDURE — 20610 DRAIN/INJ JOINT/BURSA W/O US: CPT | Performed by: ORTHOPAEDIC SURGERY

## 2018-07-31 PROCEDURE — 99024 POSTOP FOLLOW-UP VISIT: CPT | Performed by: ORTHOPAEDIC SURGERY

## 2018-07-31 ASSESSMENT — ENCOUNTER SYMPTOMS: BACK PAIN: 1

## 2018-07-31 NOTE — PROGRESS NOTES
Review of Systems   Musculoskeletal: Positive for back pain, joint pain and neck pain. Left shoulder pain  Right knee pain    Neurological:        Chronic pain    Psychiatric/Behavioral: Positive for depression. All other systems reviewed and are negative.

## 2018-07-31 NOTE — PROGRESS NOTES
Patient seen for 2 problems today. She is 6 weeks out from a rotator cuff repair for right shoulder. Making progress as would be expected with this. Status have some tightness in his shoulder from time to time has difficulty lying on that side. Her main problem today is for her right knee. She is been diagnosed with osteoarthritis. She's had steroid injections as well as anti-inflammatory medications and physical therapy but continues to have symptoms. We try to get her approved for lubricant injections to her insurance company for this was denied. She is coming in today wishing to pay out of pocket for lubricant injection for her right knee.     Pain Assessment  Location of Pain: Knee  Location Modifiers: Right  Severity of Pain: 3  Quality of Pain: Sharp, Dull, Aching  Duration of Pain: Persistent  Frequency of Pain: Intermittent  Aggravating Factors:  (lfiting arm )  Limiting Behavior: Yes  Relieving Factors: Rest, Ice  Result of Injury: No  Work-Related Injury: No  Are there other pain locations you wish to document?: No    Past Medical History:   Diagnosis Date    Anxiety     Basal cell carcinoma 2002    Cystitis, chronic     Heart murmur      possible    PONV (postoperative nausea and vomiting)     Wears dentures     has brackets for invisaline braces        Past Surgical History:   Procedure Laterality Date    COLONOSCOPY  07/08/2016    JOINT REPLACEMENT Left July 2013    knee    KNEE ARTHROSCOPY      left    KNEE SURGERY      left knee    LAPAROSCOPY      SHOULDER ARTHROSCOPY Left 06/14/2018    LABRAL DEBRIDEMENT, SUBACROMIAL    SPINE SURGERY      fusion cervical        Family History   Problem Relation Age of Onset    Osteoarthritis Mother     Asthma Mother     Hypertension Mother     Hypertension Father        Social History     Social History    Marital status:      Spouse name: N/A    Number of children: N/A    Years of education: N/A     Social History Main Topics   

## 2018-08-01 ENCOUNTER — HOSPITAL ENCOUNTER (OUTPATIENT)
Dept: PHYSICAL THERAPY | Age: 53
Discharge: HOME | End: 2018-08-02
Admitting: ORTHOPAEDIC SURGERY

## 2018-08-01 ENCOUNTER — HOSPITAL ENCOUNTER (OUTPATIENT)
Dept: OTHER | Age: 53
Discharge: HOME | End: 2018-08-31
Attending: ORTHOPAEDIC SURGERY | Admitting: ORTHOPAEDIC SURGERY

## 2018-08-01 NOTE — FLOWSHEET NOTE
[address] [place] [address] DR ANDREA GÓMEZ        Physical Therapy Daily Treatment Note  Date:  2018    Patient Name:  Magy Schwartz    :  1965  MRN: [id number]  Restrictions/Precautions:    Medical/Treatment Diagnosis Information:    M75.102 (ICD-10-CM) - Tear of left rotator cuff, unspecified tear extent; M75.32 (ICD-10-CM) - Calcific tendonitis of left shoulder; Z98.890 (ICD-10-CM) - S/P rotator cuff repair  Treatment Diagnosis: L shoulder pain, impaired ROM, impaired strength secondary to RTC repair surgery on 5239  Insurance/Certification information:  PT Insurance Information: Sara (20 visits)  Physician Information:  Referring Practitioner: Earnest Koehler  Plan of care signed (Y/N):     Date of Patient follow up with Physician: 18    G-Code (if applicable):      Date G-Code Applied:  18     PT G-Codes  Functional Assessment Tool Used: UEFI  Score: 31.25%  Functional Limitation: Carrying, moving and handling objects  Carrying, Moving and Handling Objects Current Status (): At least 60 percent but less than 80 percent impaired, limited or restricted  Carrying, Moving and Handling Objects Goal Status (): 0 percent impaired, limited or restricted     Progress Note: []  Yes  [x]  No  Next due by: Visit #15  Or 18 Aug 2018    Latex Allergy:  [x]NO      []YES  Preferred Language for Healthcare:   [x]English       []other:    Visit # Insurance Allowable   7 Ask for auth after 18  20 hard limit     Pain level: 2-3/10 2018      SUBJECTIVE:  She has come out of her sling, but she continues to have difficulty and pain sleeping.               OBJECTIVE: 18    Observation:   Test measurements:    ROM PROM AROM  Comment    L R L R    Flexion  155  supine   Abduction  180      ER  60 at 80 ABD     IR  46 at 80 ABD     Other        Other             Strength L R Comment   Flexion      Abduction      ER      IR      Supraspinatus      Upper Trap Lower Trap      Mid Trap      Rhomboids      Biceps      Triceps      Horizontal Abduction      Horizontal Adduction      Lats          RESTRICTIONS/PRECAUTIONS:  Dr. Dinesh Bassett RTC Repair Protocol       Exercises/Interventions:   Exercise/Equipment Resistance/Repetitions Other comments   Aerobic Conditioning     Aerodyne          Stretching/PROM     Wand 10x:10 flex/scap  10x:10 ER supine at neutral, 45 and 90 ABD    Table Slides 10x:10 Flex and scap   Wall slides      UE Frederick     Pulleys 10x:10 Flex and scap   Pendulum     BB IR 10x:10 cane   SL IR     Pec doorway stretch     CBA stretch     UT stretch     LS stretch     Elbow flex/ext                    Isometrics     Retraction        Weight shift     Flexion     Abduction     External Rotation     Internal Rotation     Biceps     Triceps          PRE's     Flexion     Abduction     External Rotation     Internal Rotation     Shrugs 3x10 3#    EXT     Reverse Flys     Serratus 3x10    Horizontal Abd with ER     Biceps 3x10 3#    Triceps     Retraction     Wrist ext ECL 3x10 2# ECL    Wrist ROM          Cable Column/Theraband     External Rotation     Internal Rotation     Shrugs     Lats     Ext     Flex     Scapular Retraction 3x10 red    BIC     TRIC     PNF          Dynamic Stability          Plyoback          Manual 15' gentle flex, scap, IR, ER at 80 deg ABD and IASTM over distal deltoid             Therapeutic Exercise and NMR EXR  [x] (63328) Provided verbal/tactile cueing for activities related to strengthening, flexibility, endurance, ROM  for improvements in scapular, scapulothoracic and UE control with self care, reaching, carrying, lifting, house/yardwork, driving/computer work.    [] (47797) Provided verbal/tactile cueing for activities related to improving balance, coordination, kinesthetic sense, posture, motor skill, proprioception  to assist with  scapular, scapulothoracic and UE control with self care, reaching, carrying, lifting, house/yardwork, driving/computer work. Therapeutic Activities:    [] (32919 or 11891) Provided verbal/tactile cueing for activities related to improving balance, coordination, kinesthetic sense, posture, motor skill, proprioception and motor activation to allow for proper function of scapular, scapulothoracic and UE control with self care, carrying, lifting, driving/computer work. Home Exercise Program:    [x] (44978) Reviewed/Progressed HEP activities related to strengthening, flexibility, endurance, ROM of scapular, scapulothoracic and UE control with self care, reaching, carrying, lifting, house/yardwork, driving/computer work  [] (32920) Reviewed/Progressed HEP activities related to improving balance, coordination, kinesthetic sense, posture, motor skill, proprioception of scapular, scapulothoracic and UE control with self care, reaching, carrying, lifting, house/yardwork, driving/computer work      Manual Treatments:  PROM / STM / Oscillations-Mobs:  G-I, II, III, IV (PA's, Inf., Post.)  [x] (79065) Provided manual therapy to mobilize soft tissue/joints of cervical/CT, scapular GHJ and UE for the purpose of modulating pain, promoting relaxation,  increasing ROM, reducing/eliminating soft tissue swelling/inflammation/restriction, improving soft tissue extensibility and allowing for proper ROM for normal function with self care, reaching, carrying, lifting, house/yardwork, driving/computer work    Instrument Assisted Soft Tissue Mobilization (IASTM): was applied to the following muscles: distal deltoid and gently over lateral biceps, with Hawk  tools including HG2 (handle-bar), HG4 (small can-opener), HG5 (medium can-opener), HG 8 (biscotti), and HG9 (tongue depressor). Treatment consisted of IASTM strokes including sweeping, fanning, brushing, strumming, filleting, pinning and framing, based on body region contours, nature of the soft tissue restriction and desired treatment outcomes.  These techniques is progressing as expected towards functional goals listed. [] Progression is slowed due to complexities listed. [] Progression has been slowed due to co-morbidities. [x] Plan just implemented, too soon to assess goals progression  [] Other:     ASSESSMENT:      Treatment/Activity Tolerance:  [x] Patient tolerated treatment well [] Patient limited by fatique  [] Patient limited by pain  [] Patient limited by other medical complications  [] Other:  Pt colby tx fair/well. She was given pictures of new stretches for home. She can discontinue  and wrist ROM at this point. She demo good comprehension of HEP. She demo full AROM ABD in supine. She continues to make gains in ROM appropriately. Patient education:  6/20 - Reviewed prognosis/POC, ROM restrictions, s/s of infection, sling wearing schedule. Sling adjusted for proper fit. Prognosis: [] Good [x] Fair  [] Poor    Patient Requires Follow-up: [x] Yes  [] No    PLAN: increase frequency to 2x/wk. Consider updating strengthening exercises for HEP.     [x] Continue per plan of care [] Alter current plan (see comments)  [] Plan of care initiated [] Hold pending MD visit [] Discharge    Electronically signed by: Dion Palacios DPT [id number]

## 2018-08-07 ENCOUNTER — OFFICE VISIT (OUTPATIENT)
Dept: ORTHOPEDIC SURGERY | Age: 53
End: 2018-08-07

## 2018-08-07 ENCOUNTER — HOSPITAL ENCOUNTER (OUTPATIENT)
Dept: PHYSICAL THERAPY | Age: 53
Discharge: HOME | End: 2018-08-08
Admitting: ORTHOPAEDIC SURGERY

## 2018-08-07 VITALS
HEART RATE: 66 BPM | HEIGHT: 65 IN | DIASTOLIC BLOOD PRESSURE: 65 MMHG | BODY MASS INDEX: 22.49 KG/M2 | WEIGHT: 135 LBS | SYSTOLIC BLOOD PRESSURE: 103 MMHG

## 2018-08-07 DIAGNOSIS — M17.11 PATELLOFEMORAL ARTHRITIS OF RIGHT KNEE: Primary | ICD-10-CM

## 2018-08-07 PROCEDURE — 20610 DRAIN/INJ JOINT/BURSA W/O US: CPT | Performed by: ORTHOPAEDIC SURGERY

## 2018-08-07 ASSESSMENT — ENCOUNTER SYMPTOMS: BACK PAIN: 1

## 2018-08-07 NOTE — PROGRESS NOTES
Patient returns back to the office for Alexa Altman #2. She tolerated the first injection well.      - euflexxa #2, right knee     - follow up 1 week for #3

## 2018-08-07 NOTE — FLOWSHEET NOTE
[address] [facility],[address], [facility] [address] ANDREA GÓMEZ        Physical Therapy Daily Treatment Note  Date:  2018    Patient Name:  Mitra Garzon    :  1965  MRN: [id number]  Restrictions/Precautions:    Medical/Treatment Diagnosis Information:    M75.102 (ICD-10-CM) - Tear of left rotator cuff, unspecified tear extent; M75.32 (ICD-10-CM) - Calcific tendonitis of left shoulder; Z98.890 (ICD-10-CM) - S/P rotator cuff repair  Treatment Diagnosis: L shoulder pain, impaired ROM, impaired strength secondary to RTC repair surgery on 6964  Insurance/Certification information:  PT Insurance Information: Sara (20 visits)  Physician Information:  Referring Practitioner: Bret Preciado  Plan of care signed (Y/N):     Date of Patient follow up with Physician: 18    G-Code (if applicable):      Date G-Code Applied:  18     PT G-Codes  Functional Assessment Tool Used: UEFI  Score: 31.25%  Functional Limitation: Carrying, moving and handling objects  Carrying, Moving and Handling Objects Current Status (): At least 60 percent but less than 80 percent impaired, limited or restricted  Carrying, Moving and Handling Objects Goal Status (): 0 percent impaired, limited or restricted     Progress Note: []  Yes  [x]  No  Next due by: Visit #15  Or 18 Aug 2018    Latex Allergy:  [x]NO      []YES  Preferred Language for Healthcare:   [x]English       []other:    Visit # Insurance Allowable   8 Ask for auth after 18  20 hard limit     Pain level: 3/10 2018      SUBJECTIVE:  She is not sure if her pain is getting better. She feels it is getting worse. She continues to have the pain on the side of her arm. This is the same pain she was having before the surgery, which is frustrating.       OBJECTIVE: 18    Observation:   Test measurements:    ROM PROM AROM  Comment    L R L R    Flexion  160  supine   Abduction  186      ER  67 at 90 ABD     IR  62 at 80 ABD     Other Other             Strength L R Comment   Flexion      Abduction      ER      IR      Supraspinatus      Upper Trap      Lower Trap      Mid Trap      Rhomboids      Biceps      Triceps      Horizontal Abduction      Horizontal Adduction      Lats          RESTRICTIONS/PRECAUTIONS:  Dr. Dinesh Bassett RTC Repair Protocol       Exercises/Interventions:   Exercise/Equipment Resistance/Repetitions Other comments   Aerobic Conditioning     Aerodyne          Stretching/PROM     Wand 10x:10 flex/scap  10x:10 ER supine at neutral, 45 and 90 ABD    Table Slides Wall slides  10x:10 Flex- to add to HEP   UE [place]     Pulleys 10x:10 Flex and scap   Pendulum     BB IR 10x:10 Cane/with her contralateral arm   SL IR     Pec doorway stretch     CBA stretch     UT stretch     LS stretch     Elbow flex/ext                    Isometrics     Retraction        Weight shift     Flexion 10x:10    Abduction 10x:10    External Rotation 10x:10    Internal Rotation 10x:10    Biceps     Triceps          PRE's     Flexion     Abduction     External Rotation     Internal Rotation     Shrugs 3x10 3#    EXT     Reverse Flys     Serratus 3x10    Horizontal Abd with ER     Biceps 3x10 3#    Triceps     Retraction     Wrist ext ECL 3x10 2# ECL    Wrist ROM          Cable Column/Theraband     External Rotation     Internal Rotation     Shrugs     Lats     Ext 3x10 red    Flex     Scapular Retraction 3x10 green    BIC     TRIC     PNF          Dynamic Stability          [place]          Manual 15' gentle flex, scap, IR, ER at 80 deg ABD and IASTM over distal deltoid             Therapeutic Exercise and NMR EXR  [x] (49295) Provided verbal/tactile cueing for activities related to strengthening, flexibility, endurance, ROM  for improvements in scapular, scapulothoracic and UE control with self care, reaching, carrying, lifting, house/yardwork, driving/computer work.    [] (21439) Provided verbal/tactile cueing for activities related to improving balance, coordination, kinesthetic sense, posture, motor skill, proprioception  to assist with  scapular, scapulothoracic and UE control with self care, reaching, carrying, lifting, house/yardwork, driving/computer work. Therapeutic Activities:    [] (51860 or 18619) Provided verbal/tactile cueing for activities related to improving balance, coordination, kinesthetic sense, posture, motor skill, proprioception and motor activation to allow for proper function of scapular, scapulothoracic and UE control with self care, carrying, lifting, driving/computer work. Home Exercise Program:    [x] (72116) Reviewed/Progressed HEP activities related to strengthening, flexibility, endurance, ROM of scapular, scapulothoracic and UE control with self care, reaching, carrying, lifting, house/yardwork, driving/computer work  [] (81472) Reviewed/Progressed HEP activities related to improving balance, coordination, kinesthetic sense, posture, motor skill, proprioception of scapular, scapulothoracic and UE control with self care, reaching, carrying, lifting, house/yardwork, driving/computer work      Manual Treatments:  PROM / STM / Oscillations-Mobs:  G-I, II, III, IV (PA's, Inf., Post.)  [x] (29522) Provided manual therapy to mobilize soft tissue/joints of cervical/CT, scapular GHJ and UE for the purpose of modulating pain, promoting relaxation,  increasing ROM, reducing/eliminating soft tissue swelling/inflammation/restriction, improving soft tissue extensibility and allowing for proper ROM for normal function with self care, reaching, carrying, lifting, house/yardwork, driving/computer work    Instrument Assisted Soft Tissue Mobilization (IASTM): was applied to the following muscles: distal deltoid and gently over lateral biceps, with Hawk  tools including HG2 (handle-bar), HG4 (small can-opener), HG5 (medium can-opener), HG 8 (biscotti), and HG9 (tongue depressor).  Treatment consisted of IASTM strokes including sweeping, fanning, brushing, strumming, filleting, pinning and framing, based on body region contours, nature of the soft tissue restriction and desired treatment outcomes. These techniques were used to restore neurophysiology, improve mechanotransduction, enhance fluid dynamics and break collagen crosslinks. The treatment area was exposed and the patient was draped in an appropriate manner. Upon completion the clinician cleaned the IASTM tools as per [facility] recommendations. Skin check pre: intact, no bruising  Skin check post: petechia no bruising  Intermittent tx time: 5'      Modalities: declined    Charges:   Timed Code Treatment Minutes: 40'   Total Treatment Minutes: 80'       [] EVAL (LOW) 84707 (typically 20 minutes face-to-face)  [] EVAL (MOD) 23115 (typically 30 minutes face-to-face)  [] EVAL (HIGH) 44162 (typically 45 minutes face-to-face)  [] RE-EVAL     [x] UP(22725) x  1   [] IONTO  [] NMR (93678) x      [] VASO  [x] Manual (73126) x  1    [] Other:  [x] TA x  1    [] Mech Traction (10097)  [] ES(attended) (14411)      [] ES (un) (42547):     GOALS:  Patient stated goal: full use of arm    Therapist goals for Patient:   Short Term Goals: To be achieved in: 2 weeks  1. Independent in HEP and progression per patient tolerance, in order to prevent re-injury. -met  2. Patient will have a decrease in pain to facilitate improvement in movement, function, and ADLs as indicated by Functional Deficits. -partially met    Long Term Goals: To be achieved in:16 weeks  1. Disability index score of 25% or less for the UEFS to assist with reaching prior level of function. -ongoing  2. Patient will demonstrate increased AROM to [facility] to allow for proper joint functioning as indicated by patients Functional Deficits. -ongoing  3.  Patient will demonstrate an increase in Strength to 4+/5 scapular and core control  for UE to allow for proper functional mobility as indicated by patients Functional Deficits.-ongoing

## 2018-08-09 ENCOUNTER — HOSPITAL ENCOUNTER (OUTPATIENT)
Dept: PHYSICAL THERAPY | Age: 53
Discharge: HOME | End: 2018-08-10
Admitting: ORTHOPAEDIC SURGERY

## 2018-08-09 NOTE — FLOWSHEET NOTE
kinesthetic sense, posture, motor skill, proprioception  to assist with  scapular, scapulothoracic and UE control with self care, reaching, carrying, lifting, house/yardwork, driving/computer work. Therapeutic Activities:    [] (07054 or 15479) Provided verbal/tactile cueing for activities related to improving balance, coordination, kinesthetic sense, posture, motor skill, proprioception and motor activation to allow for proper function of scapular, scapulothoracic and UE control with self care, carrying, lifting, driving/computer work. Home Exercise Program:    [x] (07762) Reviewed/Progressed HEP activities related to strengthening, flexibility, endurance, ROM of scapular, scapulothoracic and UE control with self care, reaching, carrying, lifting, house/yardwork, driving/computer work  [] (89035) Reviewed/Progressed HEP activities related to improving balance, coordination, kinesthetic sense, posture, motor skill, proprioception of scapular, scapulothoracic and UE control with self care, reaching, carrying, lifting, house/yardwork, driving/computer work      Manual Treatments:  PROM / STM / Oscillations-Mobs:  G-I, II, III, IV (PA's, Inf., Post.)  [x] (23209) Provided manual therapy to mobilize soft tissue/joints of cervical/CT, scapular GHJ and UE for the purpose of modulating pain, promoting relaxation,  increasing ROM, reducing/eliminating soft tissue swelling/inflammation/restriction, improving soft tissue extensibility and allowing for proper ROM for normal function with self care, reaching, carrying, lifting, house/yardwork, driving/computer work    Instrument Assisted Soft Tissue Mobilization (IASTM): was applied to the following muscles: distal deltoid and gently over lateral biceps, with Hawk  tools including HG2 (handle-bar), HG4 (small can-opener), HG5 (medium can-opener), HG 8 (biscotti), and HG9 (tongue depressor).  Treatment consisted of IASTM strokes including sweeping, fanning, brushing, strumming, filleting, pinning and framing, based on body region contours, nature of the soft tissue restriction and desired treatment outcomes. These techniques were used to restore neurophysiology, improve mechanotransduction, enhance fluid dynamics and break collagen crosslinks. The treatment area was exposed and the patient was draped in an appropriate manner. Upon completion the clinician cleaned the IASTM tools as per [facility] recommendations. Skin check pre: intact, no bruising  Skin check post: petechia no bruising  Intermittent tx time: 5'      Modalities: declined    Charges:   Timed Code Treatment Minutes: 45'   Total Treatment Minutes: 54'       [] EVAL (LOW) 38189 (typically 20 minutes face-to-face)  [] EVAL (MOD) 22428 (typically 30 minutes face-to-face)  [] EVAL (HIGH) 43329 (typically 45 minutes face-to-face)  [] RE-EVAL     [x] KX(09544) x  1   [] IONTO  [] NMR (43887) x      [] VASO  [x] Manual (42341) x  1    [] Other:  [x] TA x  1    [] Mech Traction (81962)  [] ES(attended) (29586)      [] ES (un) (77094):     GOALS:  Patient stated goal: full use of arm    Therapist goals for Patient:   Short Term Goals: To be achieved in: 2 weeks  1. Independent in HEP and progression per patient tolerance, in order to prevent re-injury. -met  2. Patient will have a decrease in pain to facilitate improvement in movement, function, and ADLs as indicated by Functional Deficits. -partially met    Long Term Goals: To be achieved in:16 weeks  1. Disability index score of 25% or less for the UEFS to assist with reaching prior level of function. -ongoing  2. Patient will demonstrate [facility] to allow for proper joint functioning as indicated by patients Functional Deficits. -ongoing  3. Patient will demonstrate an increase in Strength to 4+/5 scapular and core control  for UE to allow for proper functional mobility as indicated by patients Functional Deficits.-ongoing   4.  Patient will return to

## 2018-08-13 ENCOUNTER — HOSPITAL ENCOUNTER (OUTPATIENT)
Dept: PHYSICAL THERAPY | Age: 53
Discharge: HOME | End: 2018-08-14
Admitting: ORTHOPAEDIC SURGERY

## 2018-08-13 NOTE — PROGRESS NOTES
Patient comes in for her second Euflex injection for her right knee. She tolerated the first without a problem. Pain Assessment  Location of Pain: Knee  Location Modifiers: Right  Severity of Pain: 3  Quality of Pain: Sharp, Dull, Aching  Duration of Pain: A few minutes  Frequency of Pain: Intermittent  Aggravating Factors: Stairs (sitting / incline )  Limiting Behavior: Yes  Relieving Factors: Rest  Result of Injury: No  Work-Related Injury: No  Are there other pain locations you wish to document?: No    Past Medical History:   Diagnosis Date    Anxiety     Basal cell carcinoma 2002    Cystitis, chronic     Heart murmur      possible    PONV (postoperative nausea and vomiting)     Wears dentures     has brackets for invisaline braces        Past Surgical History:   Procedure Laterality Date    COLONOSCOPY  07/08/2016    JOINT REPLACEMENT Left July 2013    knee    KNEE ARTHROSCOPY      left    KNEE SURGERY      left knee    LAPAROSCOPY      SHOULDER ARTHROSCOPY Left 06/14/2018    LABRAL DEBRIDEMENT, SUBACROMIAL    SPINE SURGERY      fusion cervical        Family History   Problem Relation Age of Onset    Osteoarthritis Mother     Asthma Mother     Hypertension Mother     Hypertension Father        Social History     Social History    Marital status:      Spouse name: N/A    Number of children: N/A    Years of education: N/A     Social History Main Topics    Smoking status: Never Smoker    Smokeless tobacco: Never Used    Alcohol use No      Comment: social    Drug use: No    Sexual activity: Yes     Partners: Male     Other Topics Concern    None     Social History Narrative    None       Current Outpatient Prescriptions   Medication Sig Dispense Refill    eszopiclone (ESZOPICLONE) 3 MG TABS Take 3 mg by mouth nightly. Dieudonne New folic acid (FOLVITE) 1 MG tablet Take 1 mg by mouth daily      methotrexate (RHEUMATREX) 2.5 MG chemo tablet Take by mouth once a week      escitalopram

## 2018-08-13 NOTE — PLAN OF CARE
[id number]  Restrictions/Precautions:    Medical/Treatment Diagnosis Information:    M75.102 (ICD-10-CM) - Tear of left rotator cuff, unspecified tear extent; M75.32 (ICD-10-CM) - Calcific tendonitis of left shoulder; Z98.890 (ICD-10-CM) - S/P rotator cuff repair  Treatment Diagnosis: L shoulder pain, impaired ROM, impaired strength secondary to RTC repair surgery on 14 Jun 5336  Insurance/Certification information:  PT Insurance Information: Sara (20 visits)  Physician Information:  Referring Practitioner: Jd Lee  Plan of care signed (Y/N):     Date of Patient follow up with Physician: 8/14/18-knee injection    G-Code (if applicable):      Date G-Code Applied:  8/13/18   PT G-Codes  Functional Assessment Tool Used: UEFI  Score: 42.2%  Functional Limitation: Carrying, moving and handling objects  Carrying, Moving and Handling Objects Current Status (): At least 40 percent but less than 60 percent impaired, limited or restricted  Carrying, Moving and Handling Objects Goal Status (): 0 percent impaired, limited or restricted     Progress Note: [x]  Yes  []  No  Next due by: Visit #20  Or 13 Sept 2018    Latex Allergy:  [x]NO      []YES  Preferred Language for Healthcare:   [x]English       []other:    Visit # Insurance Allowable   10 Ask for auth after 18  20 hard limit     Pain level: 3/10 8/13/2018      SUBJECTIVE:  She continues to have pain that is about the same. She feels that this pain is similar to the pain she had before surgery. She is frustrated that it still hurts and is wondering why it still hurts. OBJECTIVE: 8/13/18    Observation: TTP over biceps and pain in biceps area with knot palpable.     Test measurements:    ROM PROM AROM  Comment    L R L R    Flexion   154 165 supine   Abduction 180 scaption manual 190 188    ER 90 at 90 ABD 86 at 90 ABD 95    IR  67 at 80 ABD 54    Other        Other             Strength L R Comment   Flexion   Nt at this time   Abduction      ER

## 2018-08-14 ENCOUNTER — OFFICE VISIT (OUTPATIENT)
Dept: ORTHOPEDIC SURGERY | Age: 53
End: 2018-08-14

## 2018-08-14 VITALS
BODY MASS INDEX: 23.16 KG/M2 | SYSTOLIC BLOOD PRESSURE: 98 MMHG | HEART RATE: 74 BPM | DIASTOLIC BLOOD PRESSURE: 63 MMHG | WEIGHT: 139 LBS | HEIGHT: 65 IN

## 2018-08-14 DIAGNOSIS — M17.11 PATELLOFEMORAL ARTHRITIS OF RIGHT KNEE: Primary | ICD-10-CM

## 2018-08-14 PROCEDURE — 20610 DRAIN/INJ JOINT/BURSA W/O US: CPT | Performed by: ORTHOPAEDIC SURGERY

## 2018-08-14 ASSESSMENT — ENCOUNTER SYMPTOMS: BACK PAIN: 1

## 2018-08-14 NOTE — PROGRESS NOTES
Nasal route daily Indications: takes in evening ) 17 g 1     No current facility-administered medications for this visit. Allergies   Allergen Reactions    Other Other (See Comments)     Bandaids cause skin irritation       Vital signs:  BP 98/63   Pulse 74   Ht 5' 5\" (1.651 m)   Wt 139 lb (63 kg)   BMI 23.13 kg/m²        Neuro: Alert & oriented x 3,  normal,  no focal deficits noted. Normal affect. Eyes: sclera clear  Ears: Normal external ear  Mouth:  No perioral lesions  Pulm: Respirations unlabored and regular  Pulse: Regular rate and rhythm   Skin: Warm, well perfused          Right Knee Exam:       Alignment:      Normal      Patella tracking:  Normal     Inspection/Skin:     Normal    Effusion:      None. Palpation:     Minimal crepitus. Nontender. Range of Motion:      Full    Strength:      Normal    Ligamentous Testing:      Stable     Neurologic:      Sensation intact to light touch    Vascular:      Skin warm and well-perfused. Additional findings: Calf soft nontender        Left Knee Exam:       Alignment:      Normal      Patella tracking:  Normal     Inspection/Skin:     Normal    Effusion:      None. Palpation:     Minimal crepitus. Nontender. Range of Motion:      Full    Strength:      Normal    Ligamentous Testing:      Stable     Neurologic:      Sensation intact to light touch    Vascular:      Skin warm and well-perfused. Additional findings:      Calf soft nontender      Impression: Osteoarthritis right knee. Plan: Euflex injection. The risks benefits and alternatives to Euflexxa injection were discussed with the patient. The patient has undergone treatment with physical therapy anti-inflammatory medication and steroid injection in the past and has been unresponsive. X-rays confirm that there is significant osteoarthritis.   After informed consent was obtained, the injection site was prepped with chlorhexidine following which the skin was anesthetized with ethyl chloride. 2cc (20 mg) of Euflexxa was placed into the right knee without complication. The patient was able to flex the knee to 90° immediately after the injection. The patient was advised to take it easy the next few days and ice and if there is any soreness. The patient was advised to contact us if any swelling, redness or increasing pain develops. All questions were answered and the patient will see me for followup on as-needed basis. The postinjection information sheet was provided. Greater than 50% of the visit was spent counseling the patient. I personally reviewed the patient's pain scale, review of systems, family history, social history, past medical history, allergies and medications. 13 point review of systems was collected today and is included in the medical record. Dana Jeffery MD  Sports Medicine, Knee and Shoulder Surgery    This dictation was performed with a verbal recognition program [facility]) and it was checked for errors. It is possible that there are still dictated errors within this office note. If so, please bring any errors to my attention for an addendum. All efforts were made to ensure that this office note is accurate.

## 2018-08-15 ENCOUNTER — HOSPITAL ENCOUNTER (OUTPATIENT)
Dept: PHYSICAL THERAPY | Age: 53
Discharge: HOME | End: 2018-08-16
Admitting: ORTHOPAEDIC SURGERY

## 2018-08-15 NOTE — FLOWSHEET NOTE
[address], [facility] [address] ANDREA GÓMEZ            Physical Therapy Daily Treatment Note  Date:  8/15/2018    Patient Name:  Lorraine Gutierrez    :  1965  MRN: [id number]  Restrictions/Precautions:    Medical/Treatment Diagnosis Information:    M75.102 (ICD-10-CM) - Tear of left rotator cuff, unspecified tear extent; M75.32 (ICD-10-CM) - Calcific tendonitis of left shoulder; Z98.890 (ICD-10-CM) - S/P rotator cuff repair  Treatment Diagnosis: L shoulder pain, impaired ROM, impaired strength secondary to RTC repair surgery on 659  Insurance/Certification information:  PT Insurance Information: Sara (20 visits)  Physician Information:  Referring Practitioner: Julieta Prakash  Plan of care signed (Y/N):     Date of Patient follow up with Physician: 18-knee injection    G-Code (if applicable):      Date G-Code Applied:  18     PT G-Codes  Functional Assessment Tool Used: UEFI  Score: 42.2%  Functional Limitation: Carrying, moving and handling objects  Carrying, Moving and Handling Objects Current Status (): At least 40 percent but less than 60 percent impaired, limited or restricted  Carrying, Moving and Handling Objects Goal Status (): 0 percent impaired, limited or restricted     Progress Note: []  Yes  [x]  No  Next due by: Visit #20  Or 2018    Latex Allergy:  [x]NO      []YES  Preferred Language for Healthcare:   [x]English       []other:    Visit # Insurance Allowable   11 Ask for auth after 18  20 hard limit     Pain level: 2/10 8/15/2018      SUBJECTIVE:  She feels that the STM is helping her feel better. She is unsure about how far along she should be. OBJECTIVE: 8/15/18    Observation: TTP over biceps and pain in biceps area with knot palpable.     Test measurements:    ROM PROM AROM  Comment    L R L R    Flexion   supine   Abduction     ER     IR     Other        Other             Strength L R Comment   Flexion   Nt at this time with  scapular, scapulothoracic and UE control with self care, reaching, carrying, lifting, house/yardwork, driving/computer work. Therapeutic Activities:    [] (08615 or 64723) Provided verbal/tactile cueing for activities related to improving balance, coordination, kinesthetic sense, posture, motor skill, proprioception and motor activation to allow for proper function of scapular, scapulothoracic and UE control with self care, carrying, lifting, driving/computer work. Home Exercise Program:    [x] (53740) Reviewed/Progressed HEP activities related to strengthening, flexibility, endurance, ROM of scapular, scapulothoracic and UE control with self care, reaching, carrying, lifting, house/yardwork, driving/computer work  [] (15178) Reviewed/Progressed HEP activities related to improving balance, coordination, kinesthetic sense, posture, motor skill, proprioception of scapular, scapulothoracic and UE control with self care, reaching, carrying, lifting, house/yardwork, driving/computer work      Manual Treatments:  PROM / STM / Oscillations-Mobs:  G-I, II, III, IV (PA's, Inf., Post.)  [x] (50184) Provided manual therapy to mobilize soft tissue/joints of cervical/CT, scapular GHJ and UE for the purpose of modulating pain, promoting relaxation,  increasing ROM, reducing/eliminating soft tissue swelling/inflammation/restriction, improving soft tissue extensibility and allowing for proper ROM for normal function with self care, reaching, carrying, lifting, house/yardwork, driving/computer work    Instrument Assisted Soft Tissue Mobilization (IASTM): was applied to the following muscles:  biceps, with [employer] tools including HG2 (handle-bar), HG4 (small can-opener), HG5 (medium can-opener), HG 8 (biscotti), and HG9 (tongue depressor).  Treatment consisted of IASTM strokes including sweeping, fanning, brushing, strumming, filleting, pinning and framing, based on body region contours, nature of the soft tissue

## 2018-08-27 ENCOUNTER — HOSPITAL ENCOUNTER (OUTPATIENT)
Dept: PHYSICAL THERAPY | Age: 53
Discharge: HOME | End: 2018-08-28
Admitting: ORTHOPAEDIC SURGERY

## 2018-08-27 NOTE — FLOWSHEET NOTE
[address], [facility] [address] ANDREA GÓMEZ            Physical Therapy Daily Treatment Note  Date:  2018    Patient Name:  Magy Schwartz    :  1965  MRN: [id number]  Restrictions/Precautions:    Medical/Treatment Diagnosis Information:    M75.102 (ICD-10-CM) - Tear of left rotator cuff, unspecified tear extent; M75.32 (ICD-10-CM) - Calcific tendonitis of left shoulder; Z98.890 (ICD-10-CM) - S/P rotator cuff repair  Treatment Diagnosis: L shoulder pain, impaired ROM, impaired strength secondary to RTC repair surgery on 1130  Insurance/Certification information:  PT Insurance Information: Sara (20 visits)  Physician Information:  Referring Practitioner: Earnest Koehler  Plan of care signed (Y/N):     Date of Patient follow up with Physician: 18    G-Code (if applicable):      Date G-Code Applied:  18     PT G-Codes  Functional Assessment Tool Used: UEFI  Score: 42.2%  Functional Limitation: Carrying, moving and handling objects  Carrying, Moving and Handling Objects Current Status (): At least 40 percent but less than 60 percent impaired, limited or restricted  Carrying, Moving and Handling Objects Goal Status (): 0 percent impaired, limited or restricted     Progress Note: []  Yes  [x]  No  Next due by: Visit #20  Or 2018    Latex Allergy:  [x]NO      []YES  Preferred Language for Healthcare:   [x]English       []other:    Visit # Insurance Allowable   12 Ask for auth after 18  20 hard limit     Pain level: 2/10 2018      SUBJECTIVE:  She feels the STM has been helpful on the side of her arm. She has been having difficulty sleeping. Her shoulder wakes her up at night. OBJECTIVE: 18    Observation: TTP over biceps and pain in biceps area with knot palpable.     Test measurements:    ROM PROM AROM  Comment    L R L R    Flexion   160 supine   Abduction  187    ER 90 at 90 ABD 70 at 90 ABD    IR  60 at 80 ABD    Other Other             Strength L R Comment   Flexion   Nt at this time   Abduction      ER      IR      Supraspinatus      Upper Trap      Lower Trap      Mid Trap      Rhomboids      Biceps      Triceps      Horizontal Abduction      Horizontal Adduction      Lats          RESTRICTIONS/PRECAUTIONS:  Dr. Noe Capellan RTC Repair Protocol       Exercises/Interventions:   Exercise/Equipment Resistance/Repetitions Other comments   Aerobic Conditioning     Aerodyne          Stretching/PROM     Wand 10x:10 flex supine  10x:10 ER supine at  90 ABD    Table Slides Wall slides  UE [place]     Pulleys 10x:10 Flex and scap   Pendulum     BB IR 10x:10 Cane/with her contralateral arm   SL IR     Pec doorway stretch     CBA stretch 10x:10    UT stretch     LS stretch     Elbow flex/ext     Shoulder ext off CC 10x:10              Isometrics     Retraction        Weight shift     Flexion Walk outs NV? Abduction    External Rotation    Internal Rotation    Biceps     Triceps          PRE's     Flexion     Abduction     External Rotation     Internal Rotation     Shrugs 3x10 4#    EXT     Reverse Flys     Serratus 3x10 5#    Horizontal Abd with ER     Biceps 3x10 4# ECL    Triceps NV?     Retraction     Wrist ext ECL  HEP   Wrist ROM          Cable Column/Theraband     External Rotation     Internal Rotation     Shrugs     Lats     Ext 3x10 green    Flex     Scapular Retraction 3x10 green    BIC     TRIC     PNF          Dynamic Stability          Plyoback          Manual 15' gentle flex, ER at 80 deg ABD and IASTM over distal deltoid             Therapeutic Exercise and NMR EXR  [x] (35017) Provided verbal/tactile cueing for activities related to strengthening, flexibility, endurance, ROM  for improvements in scapular, scapulothoracic and UE control with self care, reaching, carrying, lifting, house/yardwork, driving/computer work.    [] (73397) Provided verbal/tactile cueing for activities related to improving balance, coordination, framing, based on body region contours, nature of the soft tissue restriction and desired treatment outcomes. These techniques were used to restore neurophysiology, improve mechanotransduction, enhance fluid dynamics and break collagen crosslinks. The treatment area was exposed and the patient was draped in an appropriate manner. Upon completion the clinician cleaned the IASTM tools as per [facility] recommendations. Skin check pre: intact, no bruising  Skin check post: petechia no bruising  Intermittent tx time: 8'       Modalities: declined    Charges:  Timed Code Treatment Minutes: 40'   Total Treatment Minutes: 61'       [] EVAL (LOW) 69934 (typically 20 minutes face-to-face)  [] EVAL (MOD) 22970 (typically 30 minutes face-to-face)  [] EVAL (HIGH) 58889 (typically 45 minutes face-to-face)  [] RE-EVAL     [x] GD(65296) x  1   [] IONTO  [] NMR (94817) x      [] VASO  [x] Manual (41893) x  1    [] Other:  [x] TA x  1    [] Mech Traction (26100)  [] ES(attended) (29100)      [] ES (un) (99664):     GOALS:  Patient stated goal: full use of arm    Therapist goals for Patient:   Short Term Goals: To be achieved in: 2 weeks  1. Independent in HEP and progression per patient tolerance, in order to prevent re-injury. -met  2. Patient will have a decrease in pain to facilitate improvement in movement, function, and ADLs as indicated by Functional Deficits. -partially met    Long Term Goals: To be achieved in:16 weeks  1. Disability index score of 25% or less for the UEFS to assist with reaching prior level of function. -ongoing  2. Patient will demonstrate increased AROM to [facility] to allow for proper joint functioning as indicated by patients Functional Deficits. -ongoing  3. Patient will demonstrate an increase in Strength to 4+/5 scapular and core control  for UE to allow for proper functional mobility as indicated by patients Functional Deficits.-ongoing   4.  Patient will return to  functional activities such as

## 2018-08-29 ENCOUNTER — HOSPITAL ENCOUNTER (OUTPATIENT)
Dept: PHYSICAL THERAPY | Age: 53
Discharge: HOME | End: 2018-08-30
Admitting: ORTHOPAEDIC SURGERY

## 2018-08-29 NOTE — FLOWSHEET NOTE
[address], [facility] [address] ANDREA GÓMEZ            Physical Therapy Daily Treatment Note  Date:  2018    Patient Name:  Arley Davis    :  1965  MRN: [id number]  Restrictions/Precautions:    Medical/Treatment Diagnosis Information:    M75.102 (ICD-10-CM) - Tear of left rotator cuff, unspecified tear extent; M75.32 (ICD-10-CM) - Calcific tendonitis of left shoulder; Z98.890 (ICD-10-CM) - S/P rotator cuff repair  Treatment Diagnosis: L shoulder pain, impaired ROM, impaired strength secondary to RTC repair surgery on 4012  Insurance/Certification information:  PT Insurance Information: Sara (20 visits)  Physician Information:  Referring Practitioner: Esther Gleason  Plan of care signed (Y/N):     Date of Patient follow up with Physician: 18    G-Code (if applicable):      Date G-Code Applied:  18     PT G-Codes  Functional Assessment Tool Used: UEFI  Score: 42.2%  Functional Limitation: Carrying, moving and handling objects  Carrying, Moving and Handling Objects Current Status (): At least 40 percent but less than 60 percent impaired, limited or restricted  Carrying, Moving and Handling Objects Goal Status (): 0 percent impaired, limited or restricted     Progress Note: []  Yes  [x]  No  Next due by: Visit #20  Or 2018    Latex Allergy:  [x]NO      []YES  Preferred Language for Healthcare:   [x]English       []other:    Visit # Insurance Allowable   13 Ask for auth after 18  20 hard limit     Pain level: 2/10 2018      SUBJECTIVE:  Her pain is more of what she expected to have (more post op pain in her shoulder). The pain down her arm has gotten better. OBJECTIVE: 18    Observation: TTP over biceps and pain in biceps area with knot palpable.     Test measurements:    ROM PROM AROM  Comment    L R L R    Flexion   160 supine   Abduction  187    ER 90 at 90 ABD 70 at 90 ABD    IR  60 at 80 ABD    Other        Other Strength L R Comment   Flexion   Nt at this time   Abduction      ER      IR      Supraspinatus      Upper Trap      Lower Trap      Mid Trap      Rhomboids      Biceps      Triceps      Horizontal Abduction      Horizontal Adduction      Lats          RESTRICTIONS/PRECAUTIONS:  Dr. Cierra Billings RTC Repair Protocol       Exercises/Interventions:   Exercise/Equipment Resistance/Repetitions Other comments   Aerobic Conditioning     Aerodyne          Stretching/PROM     Wand 10x:10 flex supine  10x:10 ER supine at  90 ABD    Table Slides Wall slides  UE [place]     Pulleys 10x:10 Flex and scap   Pendulum     BB IR 10x:10 Cane/with her contralateral arm   SL IR     Pec doorway stretch     CBA stretch 10x:10    UT stretch     LS stretch     Elbow flex/ext     Shoulder ext off CC 10x:10              Isometrics     Retraction        Weight shift     Flexion    Abduction    External Rotation    Internal Rotation    Biceps     Triceps          PRE's     Flexion     Abduction     External Rotation     Internal Rotation     Shrugs 3x10 5#    EXT     Reverse Flys     Serratus 3x10 5#    Horizontal Abd with ER     Biceps 3x10 5# ECL    Triceps     Retraction     Wrist ext ECL  HEP   Wrist ROM          Cable Column/Theraband     External Rotation 10x:10 red walkouts   Internal Rotation 10x:10 green  walkouts   Shrugs     Lats     Ext 3x10 green    Flex     Scapular Retraction 3x10 green    BIC     TRIC 3x10 blue    PNF          Dynamic Stability          Plyoback          Manual 15' gentle flex, ER at 80 deg ABD and IASTM over distal deltoid             Therapeutic Exercise and NMR EXR  [x] (40166) Provided verbal/tactile cueing for activities related to strengthening, flexibility, endurance, ROM  for improvements in scapular, scapulothoracic and UE control with self care, reaching, carrying, lifting, house/yardwork, driving/computer work.    [] (66156) Provided verbal/tactile cueing for activities related to improving

## 2018-09-01 ENCOUNTER — HOSPITAL ENCOUNTER (OUTPATIENT)
Dept: OTHER | Age: 53
Discharge: HOME | End: 2018-09-01
Attending: ORTHOPAEDIC SURGERY | Admitting: ORTHOPAEDIC SURGERY

## 2018-09-04 ENCOUNTER — HOSPITAL ENCOUNTER (OUTPATIENT)
Dept: PHYSICAL THERAPY | Age: 53
Discharge: HOME | End: 2018-09-05
Admitting: ORTHOPAEDIC SURGERY

## 2018-09-04 NOTE — FLOWSHEET NOTE
[address] [facility],[address], [facility] [address] ANDREA GÓMEZ            Physical Therapy Daily Treatment Note  Date:  2018    Patient Name:  Evan Chavez    :  1965  MRN: [id number]  Restrictions/Precautions:    Medical/Treatment Diagnosis Information:    M75.102 (ICD-10-CM) - Tear of left rotator cuff, unspecified tear extent; M75.32 (ICD-10-CM) - Calcific tendonitis of left shoulder; Z98.890 (ICD-10-CM) - S/P rotator cuff repair  Treatment Diagnosis: L shoulder pain, impaired ROM, impaired strength secondary to RTC repair surgery on 8253  Insurance/Certification information:  PT Insurance Information: Sara (20 visits)  Physician Information:  Referring Practitioner: Yamile Gardner  Plan of care signed (Y/N):     Date of Patient follow up with Physician: 18    G-Code (if applicable):      Date G-Code Applied:  18     PT G-Codes  Functional Assessment Tool Used: UEFI  Score: 42.2%  Functional Limitation: Carrying, moving and handling objects  Carrying, Moving and Handling Objects Current Status (): At least 40 percent but less than 60 percent impaired, limited or restricted  Carrying, Moving and Handling Objects Goal Status (): 0 percent impaired, limited or restricted     Progress Note: []  Yes  [x]  No  Next due by: Visit #20  Or 2018    Latex Allergy:  [x]NO      []YES  Preferred Language for Healthcare:   [x]English       []other:    Visit # Insurance Allowable   14 Ask for auth after 18  20 hard limit     Pain level: 2/10 2018      SUBJECTIVE:  She is having pain with moving her arm, but it's not too bad at rest.  She sees Dr. Roel Carballo next week before leaving for [place]. OBJECTIVE: 18    Observation: TTP over biceps and pain in biceps area with knot palpable.     Test measurements:    ROM PROM AROM  Comment    L R L R    Flexion   160 supine   Abduction  190    ER  96 at 90 ABD    IR  59 at 80 ABD    Other        Other

## 2018-09-06 ENCOUNTER — HOSPITAL ENCOUNTER (OUTPATIENT)
Dept: PHYSICAL THERAPY | Age: 53
Discharge: HOME | End: 2018-09-07
Admitting: ORTHOPAEDIC SURGERY

## 2018-09-06 NOTE — FLOWSHEET NOTE
Other             Strength L R Comment   Flexion   Nt at this time   Abduction      ER      IR      Supraspinatus      Upper Trap      Lower Trap      Mid Trap      Rhomboids      Biceps      Triceps      Horizontal Abduction      Horizontal Adduction      Lats          RESTRICTIONS/PRECAUTIONS:  Dr. Tay Mehta RTC Repair Protocol       Exercises/Interventions:   Exercise/Equipment Resistance/Repetitions Other comments   Aerobic Conditioning     Aerodyne          Stretching/PROM     Wand 10x:10 flex supine with 1# wt for overpressure  10x:10 ER supine at  90 ABD    Table Slides Wall slides  UE [place]     Pulleys 10x:10 Flex and scap   Pendulum     BB IR 10x:10 Cane/with her contralateral arm   SL IR     Pec doorway stretch     CBA stretch 10x:10    UT stretch     LS stretch     Elbow flex/ext     Shoulder ext off CC 10x:10          deltoid program 5' 1# pain free motion.                 Isometrics     Retraction        Weight shift     Flexion    Abduction    External Rotation    Internal Rotation    Biceps     Triceps          PRE's     Flexion     Abduction     External Rotation     Internal Rotation     Shrugs 3x10 6#    EXT     Reverse Flys     Serratus 3x10 5#    Horizontal Abd with ER     Biceps 3x10 6# ECL    Triceps     Retraction     Wrist ext ECL     Wrist ROM          Cable Column/Theraband     External Rotation 3x10 green    Internal Rotation 3x10 blue     Shrugs     Lats     Ext 3x10 blue    Flex     Scapular Retraction 3x10 blue    BIC     TRIC 3x10 blue    PNF          Dynamic Stability          Plyoback          Manual 10' IASTM over distal deltoid/biceps             Therapeutic Exercise and NMR EXR  [x] (93238) Provided verbal/tactile cueing for activities related to strengthening, flexibility, endurance, ROM  for improvements in scapular, scapulothoracic and UE control with self care, reaching, carrying, lifting, house/yardwork, driving/computer work.    [] (63528) Provided verbal/tactile

## 2018-09-10 ENCOUNTER — HOSPITAL ENCOUNTER (OUTPATIENT)
Dept: PHYSICAL THERAPY | Age: 53
Discharge: HOME | End: 2018-09-11
Admitting: ORTHOPAEDIC SURGERY

## 2018-09-10 NOTE — PLAN OF CARE
Name:  Evita Chin    :  1965  MRN: [id number]  Restrictions/Precautions:    Medical/Treatment Diagnosis Information:    M75.102 (ICD-10-CM) - Tear of left rotator cuff, unspecified tear extent; M75.32 (ICD-10-CM) - Calcific tendonitis of left shoulder; Z98.890 (ICD-10-CM) - S/P rotator cuff repair  Treatment Diagnosis: L shoulder pain, impaired ROM, impaired strength secondary to RTC repair surgery on 2367  Insurance/Certification information:  PT Insurance Information: Sara (20 visits)  Physician Information:  Referring Practitioner: Mandi Macario  Plan of care signed (Y/N):     Date of Patient follow up with Physician: 18    G-Code (if applicable):      Date G-Code Applied:  9/10/18   PT G-Codes  Functional Assessment Tool Used: UEFI  Score: 76.25%  Functional Limitation: Carrying, moving and handling objects  Carrying, Moving and Handling Objects Current Status (): At least 20 percent but less than 40 percent impaired, limited or restricted  Carrying, Moving and Handling Objects Goal Status (): 0 percent impaired, limited or restricted    Progress Note: [x]  Yes  []  No  Next due by: Visit #20  Or 10 Oct 2018    Latex Allergy:  [x]NO      []YES  Preferred Language for Healthcare:   [x]English       []other:    Visit # Insurance Allowable   16 Ask for auth after 18  20 hard limit     Pain level: 0/10 9/10/2018      SUBJECTIVE:  2/10 pain with movement, particularly with ER at 90. She will be leaving for [place] at the end of the week. OBJECTIVE: 9/10/18    Observation: TTP over biceps and pain in biceps area with knot palpable.     Test measurements:    ROM PROM AROM  Comment    L R L R    Flexion  172 manual overpressure with some pain  160 supine   Abduction  192    ER  89 at 90 ABD    IR  65 at 80 ABD    Other        Other             Strength L R Comment   Flexion 4-     Abduction 3+ NT manually     ER 4-     IR 4     Supraspinatus      Upper Trap      Lower Trap Mid Trap      Rhomboids      Biceps      Triceps      Horizontal Abduction      Horizontal Adduction      Lats          RESTRICTIONS/PRECAUTIONS:  Dr. Acuna Records RTC Repair Protocol       Exercises/Interventions:   Exercise/Equipment Resistance/Repetitions Other comments   Aerobic Conditioning     Aerodyne          Stretching/PROM     Wand 10x:10 flex supine with 1# wt for overpressure  10x:10 ER supine at  90 ABD    Table Slides Wall slides  UE [place]     Pulleys 10x:10 Flex and scap   Pendulum     BB IR 10x:10 Cane/with her contralateral arm   SL IR     Pec doorway stretch     CBA stretch 10x:10    UT stretch     LS stretch     Elbow flex/ext     Shoulder ext off CC 10x:10         UK deltoid program 5' 1# pain free motion.                 Isometrics     Retraction        Weight shift     Flexion    Abduction    External Rotation    Internal Rotation    Biceps     Triceps          PRE's     Flexion     Abduction 3x10  Standing scaption   External Rotation     Internal Rotation     Shrugs 3x10 6#    EXT     Reverse Flys     Serratus 3x10 6#    Horizontal Abd with ER     Biceps 3x10 6# ECL    Triceps     Retraction     Wrist ext ECL     Wrist ROM          Cable Column/Theraband     External Rotation 3x10 green    Internal Rotation 3x10 blue     Shrugs     Lats     Ext 3x10 blue    Flex     Scapular Retraction 3x10 blue    BIC     TRIC 3x10 blue    PNF          Dynamic Stability          Plyoback          Manual 10' IASTM over distal deltoid/biceps             Therapeutic Exercise and NMR EXR  [x] (41774) Provided verbal/tactile cueing for activities related to strengthening, flexibility, endurance, ROM  for improvements in scapular, scapulothoracic and UE control with self care, reaching, carrying, lifting, house/yardwork, driving/computer work.    [] (77855) Provided verbal/tactile cueing for activities related to improving balance, coordination, kinesthetic sense, posture, motor skill, proprioception  to assist partially met. Pulling shirts off is difficult. Progression Towards Functional goals:  [] Patient is progressing as expected towards functional goals listed. [] Progression is slowed due to complexities listed. [] Progression has been slowed due to co-morbidities. [x] Plan just implemented, too soon to assess goals progression  [] Other:     ASSESSMENT:      Treatment/Activity Tolerance:  [x] Patient tolerated treatment well [] Patient limited by fatique  [] Patient limited by pain  [] Patient limited by other medical complications  [] Other: Pt colby tx well. We did review an updated HEP. I went over what exercises can be progressed when she is out of [place] and how to progress them. She felt comfortable with this. She will be in another time this week to review them before she leaves. She does have my card in case she needs to call/email when out of town. She understands how and when to progress exercises. She has all her TB at home. She does have acces to a gym with some weights. She will be seeing Dr. Cierra Billings tomorrow for her f/u. She does continue to demo some shoulder weakness that is to be expected at this time. She understands the importance of continuing her HEP. Patient education:  6/20 - Reviewed prognosis/POC, ROM restrictions, s/s of infection, sling wearing schedule. Sling adjusted for proper fit. Prognosis: [] Good [x] Fair  [] Poor    Patient Requires Follow-up: [x] Yes  [] No    PLAN: progress strengthening per pt colby. Consider reviewing/doing a few reps of the new exercises.     [x] Continue per plan of care [] Alter current plan (see comments)  [] Plan of care initiated [] Hold pending MD visit [] Discharge    Electronically signed by: Consuelo Vincent DPT [id number]

## 2018-09-11 ENCOUNTER — OFFICE VISIT (OUTPATIENT)
Dept: ORTHOPEDIC SURGERY | Age: 53
End: 2018-09-11

## 2018-09-11 VITALS
HEIGHT: 64 IN | HEART RATE: 75 BPM | BODY MASS INDEX: 23.9 KG/M2 | SYSTOLIC BLOOD PRESSURE: 102 MMHG | DIASTOLIC BLOOD PRESSURE: 68 MMHG | WEIGHT: 140 LBS

## 2018-09-11 DIAGNOSIS — M17.11 PATELLOFEMORAL ARTHRITIS OF RIGHT KNEE: Primary | ICD-10-CM

## 2018-09-11 DIAGNOSIS — Z98.890 S/P ROTATOR CUFF REPAIR: ICD-10-CM

## 2018-09-11 PROCEDURE — 99214 OFFICE O/P EST MOD 30 MIN: CPT | Performed by: ORTHOPAEDIC SURGERY

## 2018-09-11 PROCEDURE — 20610 DRAIN/INJ JOINT/BURSA W/O US: CPT | Performed by: ORTHOPAEDIC SURGERY

## 2018-09-11 PROCEDURE — 99024 POSTOP FOLLOW-UP VISIT: CPT | Performed by: ORTHOPAEDIC SURGERY

## 2018-09-11 ASSESSMENT — ENCOUNTER SYMPTOMS: BACK PAIN: 1

## 2018-09-12 ENCOUNTER — HOSPITAL ENCOUNTER (OUTPATIENT)
Dept: PHYSICAL THERAPY | Age: 53
Discharge: HOME | End: 2018-09-13
Admitting: ORTHOPAEDIC SURGERY

## 2018-09-12 NOTE — PLAN OF CARE
Treatment Note  Date:  2018    Patient Name:  Orestes Renee    :  1965  MRN: [id number]  Restrictions/Precautions:    Medical/Treatment Diagnosis Information:    M75.102 (ICD-10-CM) - Tear of left rotator cuff, unspecified tear extent; M75.32 (ICD-10-CM) - Calcific tendonitis of left shoulder; Z98.890 (ICD-10-CM) - S/P rotator cuff repair  Treatment Diagnosis: L shoulder pain, impaired ROM, impaired strength secondary to RTC repair surgery on 4327  Insurance/Certification information:  PT Insurance Information: Sara (20 visits)  Physician Information:  Referring Practitioner: Santi Arellano  Plan of care signed (Y/N):     Date of Patient follow up with Physician: 18    G-Code (if applicable):      Date G-Code Applied:  9/10/18     PT G-Codes  Functional Assessment Tool Used: UEFI  Score: 76.25%  Functional Limitation: Carrying, moving and handling objects  Carrying, Moving and Handling Objects Current Status (): At least 20 percent but less than 40 percent impaired, limited or restricted  Carrying, Moving and Handling Objects Goal Status (): 0 percent impaired, limited or restricted    Progress Note: [x]  Yes  []  No  Next due by: Visit #20  Or 10 Oct 2018    Latex Allergy:  [x]NO      []YES  Preferred Language for Healthcare:   [x]English       []other:    Visit # Insurance Allowable   17 Ask for auth after 18  20 hard limit     Pain level: 0/10 2018      SUBJECTIVE: She has no c/o pain at rest, but her pain is 2/10 with movement of the arm. She did see Dr. Suzy Hoff yesterday. She was told that she has some tightness in end range, but it's not unexpected. She was also told that her strength is where it is expected to be. She did get a cortisone injection yesterday for her knee, but she was kneeling in her bathtub to clean it, and she felt she tweaked her knee. Now it is sore.       OBJECTIVE: 18- palpable knot on distal deltoid and biceps  9/10/18    Observation: TTP over biceps and pain in biceps area with knot palpable. Test measurements:    ROM PROM AROM  Comment    L R L R    Flexion  172 manual overpressure with some pain  160 supine   Abduction  192    ER  89 at 90 ABD    IR  65 at 80 ABD    Other        Other             Strength L R Comment   Flexion 4-     Abduction 3+ NT manually     ER 4-     IR 4     Supraspinatus      Upper Trap      Lower Trap      Mid Trap      Rhomboids      Biceps      Triceps      Horizontal Abduction      Horizontal Adduction      Lats          RESTRICTIONS/PRECAUTIONS:  Dr. Dinesh Bassett RTC Repair Protocol       Exercises/Interventions:   Exercise/Equipment Resistance/Repetitions Other comments   Aerobic Conditioning     Aerodyne          Stretching/PROM     Wand 10x:10 flex supine with 1# wt for overpressure  10x:10 ER supine at  90 ABD    Table Slides Wall slides  UE [place]     Pulleys 10x:10 Flex and scap   Pendulum     BB IR 10x:10 Cane/with her contralateral arm   SL IR     Pec doorway stretch     CBA stretch 10x:10    UT stretch     LS stretch     Elbow flex/ext     Shoulder ext off CC 10x:10         UK deltoid program 5' 1# pain free motion.                 Isometrics     Retraction        Weight shift     Flexion    Abduction    External Rotation    Internal Rotation    Biceps     Triceps          PRE's     Flexion     Abduction 3x10  Standing scaption   External Rotation 3x10 1#    Internal Rotation 3x10 4#    Shrugs 3x10 6#    EXT     Reverse Flys     Serratus 3x10 6#    Horizontal Abd with ER     Biceps 3x10 6# ECL    Triceps     Retraction     Wrist ext ECL     Wrist ROM          Cable Column/Theraband     External Rotation    Internal Rotation    Shrugs     Lats     Ext 3x10 blue    Flex     Scapular Retraction 3x10 blue    BIC     TRIC 3x10 blue    PNF          Dynamic Stability          Plyoback     Ball on wall  reviewed for [facility] pt did a few reps              Manual 10' IASTM over distal deltoid/biceps -met  2. Patient will demonstrate increased AROM to [facility] [place] to allow for proper joint functioning as indicated by patients Functional Deficits. -met  3. Patient will demonstrate an increase in Strength to 4+/5 scapular and core control  for UE to allow for proper functional mobility as indicated by patients Functional Deficits. -partially met  4. Patient will return to  functional activities such as donning/doffing clothing  without increased symptoms or restriction. - partially met. Pulling shirts off is difficult. Progression Towards Functional goals:  [] Patient is progressing as expected towards functional goals listed. [] Progression is slowed due to complexities listed. [] Progression has been slowed due to co-morbidities. [x] Plan just implemented, too soon to assess goals progression  [] Other:     ASSESSMENT:      Treatment/Activity Tolerance:  [x] Patient tolerated treatment well [] Patient limited by fatique  [] Patient limited by pain  [] Patient limited by other medical complications  [] Other: Pt colby tx fair/well. We did continue STM over distal biceps today, and she did get some petecchia afterwards. We reviewed the progression of her exercises that she will be doing while in [place]. She was able to complete some of these without problems. She feels comfortable continuing with her routine whaile in [place]. She can call me or email me at any time with questions or concerns. This will be considered her D/C note, but she did mention possibly coming back for a check in visit after returning from [place]. I'm also okay with this. We reviewed her insurance policy. Pt's measurements were from earlier this week. She understands what she needs to continue working on. Patient education:  6/20 - Reviewed prognosis/POC, ROM restrictions, s/s of infection, sling wearing schedule. Sling adjusted for proper fit.       Prognosis: [] Good [x] Fair  [] Poor    Patient Requires Follow-up: [x] Yes  [] No    PLAN: Pt is d/C to [facility]. She may return for a check in upon her return to [address] [place] [address].     [x] Continue per plan of care [] Alter current plan (see comments)  [] Plan of care initiated [] Hold pending MD visit [] Discharge    Electronically signed by: Leonila Jay DPT [id number]

## 2018-12-11 ENCOUNTER — OFFICE VISIT (OUTPATIENT)
Dept: ORTHOPEDIC SURGERY | Age: 53
End: 2018-12-11
Payer: COMMERCIAL

## 2018-12-11 VITALS
SYSTOLIC BLOOD PRESSURE: 99 MMHG | WEIGHT: 138 LBS | HEART RATE: 62 BPM | BODY MASS INDEX: 23.56 KG/M2 | DIASTOLIC BLOOD PRESSURE: 62 MMHG | HEIGHT: 64 IN

## 2018-12-11 DIAGNOSIS — Z98.890 S/P ROTATOR CUFF REPAIR: Primary | ICD-10-CM

## 2018-12-11 PROCEDURE — 99213 OFFICE O/P EST LOW 20 MIN: CPT | Performed by: ORTHOPAEDIC SURGERY

## 2018-12-12 ENCOUNTER — TELEPHONE (OUTPATIENT)
Dept: ORTHOPEDIC SURGERY | Age: 53
End: 2018-12-12

## 2018-12-17 ENCOUNTER — OFFICE VISIT (OUTPATIENT)
Dept: ORTHOPEDIC SURGERY | Age: 53
End: 2018-12-17
Payer: COMMERCIAL

## 2018-12-17 VITALS
HEART RATE: 62 BPM | DIASTOLIC BLOOD PRESSURE: 62 MMHG | HEIGHT: 64 IN | WEIGHT: 138 LBS | SYSTOLIC BLOOD PRESSURE: 95 MMHG | BODY MASS INDEX: 23.56 KG/M2

## 2018-12-17 DIAGNOSIS — M25.512 LEFT SHOULDER PAIN, UNSPECIFIED CHRONICITY: ICD-10-CM

## 2018-12-17 DIAGNOSIS — Z98.890 S/P ROTATOR CUFF REPAIR: Primary | ICD-10-CM

## 2018-12-17 PROCEDURE — 99213 OFFICE O/P EST LOW 20 MIN: CPT | Performed by: ORTHOPAEDIC SURGERY

## 2018-12-17 PROCEDURE — 20610 DRAIN/INJ JOINT/BURSA W/O US: CPT | Performed by: ORTHOPAEDIC SURGERY

## 2018-12-17 NOTE — PROGRESS NOTES
MG chemo tablet Take by mouth once a week      escitalopram (LEXAPRO) 10 MG tablet TAKE ONE TABLET BY MOUTH EVERY DAY (Patient taking differently: TAKE ONE TABLET BY MOUTH EVERY DAY takes in evening) 30 tablet 2    cetirizine (ZYRTEC ALLERGY) 10 MG tablet Take 10 mg by mouth daily Indications: takes in evening       mometasone (NASONEX) 50 MCG/ACT nasal spray 2 sprays by Nasal route daily. (Patient taking differently: 2 sprays by Nasal route daily Indications: takes in evening ) 17 g 1     No current facility-administered medications for this visit. Allergies   Allergen Reactions    Other Other (See Comments)     Bandaids cause skin irritation       Vital signs:  BP 95/62   Pulse 62   Ht 5' 4\" (1.626 m)   Wt 138 lb (62.6 kg)   BMI 23.69 kg/m²        Neuro: Alert & oriented x 3,  normal,  no focal deficits noted. Normal affect. Eyes: sclera clear  Ears: Normal external ear  Mouth:  No perioral lesions  Pulm: Respirations unlabored and regular  Pulse: Regular rate and rhythm   Skin: Warm, well perfused    Constitutional: The physical examination finds the patient to be well-developed and well-nourished. The patient is alert and oriented x3 and was cooperative throughout the visit. LEFT Shoulder Examination:    Inspection:  Held in a normal posture. Normal contour at the acromioclavicular joint. No swelling, ecchymosis, or erythema about the shoulder. No atrophy appreciated. Palpation:  No subacromial crepitus. Range of Motion: Full passive and active ROM. Normal scapulothoracic rhythm. Pain with active forward flexion. Strength:  Normal supraspinatus, infraspinatus, and subscapularis muscle strength. Stability: No anterior instability. No posterior instability. Special Tests:  Crossover sign is negative. Belly press sign is negative. Lift off sign is negative. Impingement findings are negative. Labral findings are negative. Speed sign and Yergason signs are both negative.     Other

## 2020-08-17 ENCOUNTER — OFFICE VISIT (OUTPATIENT)
Dept: PRIMARY CARE CLINIC | Age: 55
End: 2020-08-17
Payer: COMMERCIAL

## 2020-08-17 PROCEDURE — 99211 OFF/OP EST MAY X REQ PHY/QHP: CPT | Performed by: NURSE PRACTITIONER

## 2020-08-17 NOTE — PROGRESS NOTES
Delvin Yip received a viral test for COVID-19. They were educated on isolation and quarantine as appropriate. For any symptoms, they were directed to seek care from their PCP, given contact information to establish with a doctor, directed to an urgent care or the emergency room.

## 2020-08-18 ENCOUNTER — TELEPHONE (OUTPATIENT)
Dept: ADMINISTRATIVE | Age: 55
End: 2020-08-18

## 2020-08-18 LAB — SARS-COV-2, NAA: DETECTED

## 2020-08-18 NOTE — TELEPHONE ENCOUNTER
Pt made aware that the COVID-19 test result was positive       Treatment of coronavirus does not require an antibiotic       Remain isolated for 10 days minimum or 72 hours after your symptoms have completely resolved, whichever is longer.       Wash hands often with soap and water for at least 20 seconds or alternatively use hand  with at least 60% alcohol content       Cover coughs and sneezes       Wear a mask when around others if possible       Clean all \"high-touch\" surfaces every day, such as doorknobs and cellphones       Continually monitor symptoms. Contact a medical provider if symptoms are worsening, such as difficulty breathing.       Your local health department will reach out to you and instruct you on your return to work and the general public.       Anyone that lives in the home, or had contact with you, should be in quarantine for 14 days, even with a negative COVID-19 test.           For additional information, please visit the Centers for Disease Control and Prevention at  [facility]..

## 2020-09-08 ENCOUNTER — OFFICE VISIT (OUTPATIENT)
Dept: PRIMARY CARE CLINIC | Age: 55
End: 2020-09-08
Payer: COMMERCIAL

## 2020-09-08 PROCEDURE — 99211 OFF/OP EST MAY X REQ PHY/QHP: CPT | Performed by: NURSE PRACTITIONER

## 2020-09-08 NOTE — PROGRESS NOTES
Suze Chavarria received a viral test for COVID-19. They were educated on isolation and quarantine as appropriate. For any symptoms, they were directed to seek care from their PCP, given contact information to establish with a doctor, directed to an urgent care or the emergency room.

## 2020-09-10 LAB — SARS-COV-2, NAA: NOT DETECTED
